# Patient Record
Sex: MALE | Race: WHITE | NOT HISPANIC OR LATINO | Employment: OTHER | URBAN - METROPOLITAN AREA
[De-identification: names, ages, dates, MRNs, and addresses within clinical notes are randomized per-mention and may not be internally consistent; named-entity substitution may affect disease eponyms.]

---

## 2017-05-18 ENCOUNTER — OFFICE VISIT - RIVER FALLS (OUTPATIENT)
Dept: FAMILY MEDICINE | Facility: CLINIC | Age: 67
End: 2017-05-18

## 2017-05-18 ENCOUNTER — COMMUNICATION - RIVER FALLS (OUTPATIENT)
Dept: FAMILY MEDICINE | Facility: CLINIC | Age: 67
End: 2017-05-18

## 2017-05-18 ASSESSMENT — MIFFLIN-ST. JEOR: SCORE: 1886.65

## 2017-05-19 LAB
CHOLEST SERPL-MCNC: 183 MG/DL (ref 125–200)
CHOLEST/HDLC SERPL: 3.7 {RATIO}
CREAT SERPL-MCNC: 0.76 MG/DL (ref 0.7–1.25)
GLUCOSE BLD-MCNC: 103 MG/DL (ref 65–99)
HBA1C MFR BLD: 6.4 %
HDLC SERPL-MCNC: 49 MG/DL
LDLC SERPL CALC-MCNC: 119 MG/DL
NONHDLC SERPL-MCNC: 134 MG/DL
TRIGL SERPL-MCNC: 75 MG/DL

## 2017-10-04 ENCOUNTER — OFFICE VISIT - RIVER FALLS (OUTPATIENT)
Dept: FAMILY MEDICINE | Facility: CLINIC | Age: 67
End: 2017-10-04

## 2017-10-04 ASSESSMENT — MIFFLIN-ST. JEOR: SCORE: 1915.68

## 2017-10-05 LAB
CHOLEST SERPL-MCNC: 174 MG/DL
CHOLEST/HDLC SERPL: 3.9 {RATIO}
CREAT SERPL-MCNC: 0.78 MG/DL (ref 0.7–1.25)
GLUCOSE BLD-MCNC: 102 MG/DL (ref 65–99)
HBA1C MFR BLD: 6 %
HDLC SERPL-MCNC: 45 MG/DL
LDLC SERPL CALC-MCNC: 112 MG/DL
NONHDLC SERPL-MCNC: 129 MG/DL
PSA SERPL-MCNC: 0.8 NG/ML
TRIGL SERPL-MCNC: 81 MG/DL

## 2018-05-07 ENCOUNTER — AMBULATORY - RIVER FALLS (OUTPATIENT)
Dept: FAMILY MEDICINE | Facility: CLINIC | Age: 68
End: 2018-05-07

## 2018-05-08 LAB
CREAT SERPL-MCNC: 1 MG/DL (ref 0.7–1.25)
GLUCOSE BLD-MCNC: 87 MG/DL (ref 65–99)

## 2018-05-14 ENCOUNTER — OFFICE VISIT - RIVER FALLS (OUTPATIENT)
Dept: FAMILY MEDICINE | Facility: CLINIC | Age: 68
End: 2018-05-14

## 2018-05-14 ASSESSMENT — MIFFLIN-ST. JEOR: SCORE: 1915.68

## 2018-10-22 ENCOUNTER — OFFICE VISIT - RIVER FALLS (OUTPATIENT)
Dept: FAMILY MEDICINE | Facility: CLINIC | Age: 68
End: 2018-10-22

## 2018-10-22 ASSESSMENT — MIFFLIN-ST. JEOR: SCORE: 1902.98

## 2019-01-22 ENCOUNTER — OFFICE VISIT - RIVER FALLS (OUTPATIENT)
Dept: FAMILY MEDICINE | Facility: CLINIC | Age: 69
End: 2019-01-22

## 2019-01-22 ASSESSMENT — MIFFLIN-ST. JEOR: SCORE: 1952.87

## 2019-01-23 LAB
A/G RATIO - HISTORICAL: 1.7 (ref 1–2.5)
ALBUMIN SERPL-MCNC: 4.2 GM/DL (ref 3.6–5.1)
ALP SERPL-CCNC: 63 UNIT/L (ref 40–115)
ALT SERPL W P-5'-P-CCNC: 41 UNIT/L (ref 9–46)
AST SERPL W P-5'-P-CCNC: 23 UNIT/L (ref 10–35)
BILIRUB SERPL-MCNC: 0.8 MG/DL (ref 0.2–1.2)
BUN SERPL-MCNC: 17 MG/DL (ref 7–25)
BUN/CREAT RATIO - HISTORICAL: ABNORMAL (ref 6–22)
CALCIUM SERPL-MCNC: 9.2 MG/DL (ref 8.6–10.3)
CHLORIDE BLD-SCNC: 103 MMOL/L (ref 98–110)
CHOLEST SERPL-MCNC: 184 MG/DL
CHOLEST/HDLC SERPL: 3.8 {RATIO}
CO2 SERPL-SCNC: 27 MMOL/L (ref 20–32)
CREAT SERPL-MCNC: 0.75 MG/DL (ref 0.7–1.25)
EGFRCR SERPLBLD CKD-EPI 2021: 96 ML/MIN/1.73M2
ERYTHROCYTE [DISTWIDTH] IN BLOOD BY AUTOMATED COUNT: 13.2 % (ref 11–15)
GLOBULIN: 2.5 (ref 1.9–3.7)
GLUCOSE BLD-MCNC: 108 MG/DL (ref 65–99)
HCT VFR BLD AUTO: 41.5 % (ref 38.5–50)
HDLC SERPL-MCNC: 48 MG/DL
HGB BLD-MCNC: 14.2 GM/DL (ref 13.2–17.1)
LDLC SERPL CALC-MCNC: 119 MG/DL
MCH RBC QN AUTO: 29.2 PG (ref 27–33)
MCHC RBC AUTO-ENTMCNC: 34.2 GM/DL (ref 32–36)
MCV RBC AUTO: 85.4 FL (ref 80–100)
NONHDLC SERPL-MCNC: 136 MG/DL
PLATELET # BLD AUTO: 277 10*3/UL (ref 140–400)
PMV BLD: 10.7 FL (ref 7.5–12.5)
POTASSIUM BLD-SCNC: 4.2 MMOL/L (ref 3.5–5.3)
PROT SERPL-MCNC: 6.7 GM/DL (ref 6.1–8.1)
PSA SERPL-MCNC: 0.9 NG/ML
RBC # BLD AUTO: 4.86 10*6/UL (ref 4.2–5.8)
SODIUM SERPL-SCNC: 137 MMOL/L (ref 135–146)
TRIGL SERPL-MCNC: 75 MG/DL
WBC # BLD AUTO: 5.9 10*3/UL (ref 3.8–10.8)

## 2019-06-25 ENCOUNTER — OFFICE VISIT - RIVER FALLS (OUTPATIENT)
Dept: FAMILY MEDICINE | Facility: CLINIC | Age: 69
End: 2019-06-25

## 2019-06-25 ASSESSMENT — MIFFLIN-ST. JEOR: SCORE: 1952.87

## 2019-07-02 ENCOUNTER — OFFICE VISIT - RIVER FALLS (OUTPATIENT)
Dept: FAMILY MEDICINE | Facility: CLINIC | Age: 69
End: 2019-07-02

## 2019-07-02 ASSESSMENT — MIFFLIN-ST. JEOR: SCORE: 1952.87

## 2019-07-08 ENCOUNTER — COMMUNICATION - RIVER FALLS (OUTPATIENT)
Dept: FAMILY MEDICINE | Facility: CLINIC | Age: 69
End: 2019-07-08

## 2019-07-15 ENCOUNTER — OFFICE VISIT - RIVER FALLS (OUTPATIENT)
Dept: FAMILY MEDICINE | Facility: CLINIC | Age: 69
End: 2019-07-15

## 2019-07-15 ASSESSMENT — MIFFLIN-ST. JEOR: SCORE: 1952.87

## 2019-08-08 ENCOUNTER — OFFICE VISIT - RIVER FALLS (OUTPATIENT)
Dept: FAMILY MEDICINE | Facility: CLINIC | Age: 69
End: 2019-08-08

## 2019-08-08 ASSESSMENT — MIFFLIN-ST. JEOR: SCORE: 1916.59

## 2019-08-09 LAB
A/G RATIO - HISTORICAL: 1.3 (ref 1–2.5)
ALBUMIN SERPL-MCNC: 4 GM/DL (ref 3.6–5.1)
ALP SERPL-CCNC: 73 UNIT/L (ref 40–115)
ALT SERPL W P-5'-P-CCNC: 15 UNIT/L (ref 9–46)
AST SERPL W P-5'-P-CCNC: 11 UNIT/L (ref 10–35)
BILIRUB SERPL-MCNC: 0.4 MG/DL (ref 0.2–1.2)
BUN SERPL-MCNC: 33 MG/DL (ref 7–25)
BUN/CREAT RATIO - HISTORICAL: 28 (ref 6–22)
CALCIUM SERPL-MCNC: 9.5 MG/DL (ref 8.6–10.3)
CHLORIDE BLD-SCNC: 105 MMOL/L (ref 98–110)
CO2 SERPL-SCNC: 28 MMOL/L (ref 20–32)
CREAT SERPL-MCNC: 1.16 MG/DL (ref 0.7–1.25)
EGFRCR SERPLBLD CKD-EPI 2021: 65 ML/MIN/1.73M2
ERYTHROCYTE [DISTWIDTH] IN BLOOD BY AUTOMATED COUNT: 13.3 % (ref 11–15)
GLOBULIN: 3 (ref 1.9–3.7)
GLUCOSE BLD-MCNC: 125 MG/DL (ref 65–99)
HCT VFR BLD AUTO: 35.6 % (ref 38.5–50)
HGB BLD-MCNC: 12.1 GM/DL (ref 13.2–17.1)
MCH RBC QN AUTO: 29.4 PG (ref 27–33)
MCHC RBC AUTO-ENTMCNC: 34 GM/DL (ref 32–36)
MCV RBC AUTO: 86.6 FL (ref 80–100)
PLATELET # BLD AUTO: 324 10*3/UL (ref 140–400)
PMV BLD: 10 FL (ref 7.5–12.5)
POTASSIUM BLD-SCNC: 4 MMOL/L (ref 3.5–5.3)
PROT SERPL-MCNC: 7 GM/DL (ref 6.1–8.1)
RBC # BLD AUTO: 4.11 10*6/UL (ref 4.2–5.8)
SODIUM SERPL-SCNC: 139 MMOL/L (ref 135–146)
WBC # BLD AUTO: 7.8 10*3/UL (ref 3.8–10.8)

## 2019-08-12 ENCOUNTER — COMMUNICATION - RIVER FALLS (OUTPATIENT)
Dept: FAMILY MEDICINE | Facility: CLINIC | Age: 69
End: 2019-08-12

## 2019-10-21 ENCOUNTER — OFFICE VISIT - RIVER FALLS (OUTPATIENT)
Dept: FAMILY MEDICINE | Facility: CLINIC | Age: 69
End: 2019-10-21

## 2019-10-21 ASSESSMENT — MIFFLIN-ST. JEOR: SCORE: 1916.59

## 2020-04-21 ENCOUNTER — OFFICE VISIT - RIVER FALLS (OUTPATIENT)
Dept: FAMILY MEDICINE | Facility: CLINIC | Age: 70
End: 2020-04-21

## 2021-04-05 ENCOUNTER — TRANSFERRED RECORDS (OUTPATIENT)
Dept: MULTI SPECIALTY CLINIC | Facility: CLINIC | Age: 71
End: 2021-04-05

## 2021-04-05 ENCOUNTER — OFFICE VISIT - RIVER FALLS (OUTPATIENT)
Dept: FAMILY MEDICINE | Facility: CLINIC | Age: 71
End: 2021-04-05

## 2021-04-05 ASSESSMENT — MIFFLIN-ST. JEOR: SCORE: 1893.91

## 2021-04-06 LAB
BUN SERPL-MCNC: 23 MG/DL (ref 7–25)
BUN/CREAT RATIO - HISTORICAL: ABNORMAL (ref 6–22)
CALCIUM SERPL-MCNC: 9.9 MG/DL (ref 8.6–10.3)
CHLORIDE BLD-SCNC: 100 MMOL/L (ref 98–110)
CHOLEST SERPL-MCNC: 198 MG/DL
CHOLEST/HDLC SERPL: 4.7 {RATIO}
CO2 SERPL-SCNC: 28 MMOL/L (ref 20–32)
CREAT SERPL-MCNC: 0.87 MG/DL (ref 0.7–1.25)
EGFRCR SERPLBLD CKD-EPI 2021: 88 ML/MIN/1.73M2
GLUCOSE BLD-MCNC: 110 MG/DL (ref 65–99)
HDLC SERPL-MCNC: 42 MG/DL
LDLC SERPL CALC-MCNC: 131 MG/DL
NONHDLC SERPL-MCNC: 156 MG/DL
POTASSIUM BLD-SCNC: 4.3 MMOL/L (ref 3.5–5.3)
SODIUM SERPL-SCNC: 133 MMOL/L (ref 135–146)
TRIGL SERPL-MCNC: 133 MG/DL

## 2021-04-07 ENCOUNTER — COMMUNICATION - RIVER FALLS (OUTPATIENT)
Dept: FAMILY MEDICINE | Facility: CLINIC | Age: 71
End: 2021-04-07

## 2022-02-11 VITALS
HEART RATE: 76 BPM | TEMPERATURE: 98.4 F | BODY MASS INDEX: 34.83 KG/M2 | OXYGEN SATURATION: 98 % | HEIGHT: 71 IN | DIASTOLIC BLOOD PRESSURE: 88 MMHG | WEIGHT: 248.8 LBS | SYSTOLIC BLOOD PRESSURE: 148 MMHG

## 2022-02-11 VITALS
BODY MASS INDEX: 35.98 KG/M2 | OXYGEN SATURATION: 99 % | HEIGHT: 71 IN | DIASTOLIC BLOOD PRESSURE: 72 MMHG | WEIGHT: 257 LBS | HEART RATE: 58 BPM | SYSTOLIC BLOOD PRESSURE: 144 MMHG

## 2022-02-11 VITALS
HEIGHT: 71 IN | DIASTOLIC BLOOD PRESSURE: 82 MMHG | DIASTOLIC BLOOD PRESSURE: 62 MMHG | HEART RATE: 65 BPM | HEART RATE: 66 BPM | DIASTOLIC BLOOD PRESSURE: 62 MMHG | OXYGEN SATURATION: 98 % | SYSTOLIC BLOOD PRESSURE: 138 MMHG | BODY MASS INDEX: 35.98 KG/M2 | OXYGEN SATURATION: 98 % | DIASTOLIC BLOOD PRESSURE: 74 MMHG | BODY MASS INDEX: 34.86 KG/M2 | SYSTOLIC BLOOD PRESSURE: 118 MMHG | HEIGHT: 71 IN | WEIGHT: 257 LBS | SYSTOLIC BLOOD PRESSURE: 138 MMHG | HEIGHT: 71 IN | OXYGEN SATURATION: 99 % | WEIGHT: 249 LBS | HEART RATE: 82 BPM | HEIGHT: 71 IN | OXYGEN SATURATION: 93 % | SYSTOLIC BLOOD PRESSURE: 132 MMHG | BODY MASS INDEX: 35.98 KG/M2 | WEIGHT: 257 LBS | HEART RATE: 61 BPM | BODY MASS INDEX: 35.98 KG/M2 | WEIGHT: 257 LBS

## 2022-02-11 VITALS
SYSTOLIC BLOOD PRESSURE: 136 MMHG | HEIGHT: 71 IN | OXYGEN SATURATION: 99 % | DIASTOLIC BLOOD PRESSURE: 84 MMHG | BODY MASS INDEX: 34.83 KG/M2 | HEART RATE: 61 BPM | WEIGHT: 248.8 LBS

## 2022-02-11 VITALS
OXYGEN SATURATION: 96 % | DIASTOLIC BLOOD PRESSURE: 70 MMHG | SYSTOLIC BLOOD PRESSURE: 134 MMHG | TEMPERATURE: 97.8 F | WEIGHT: 246 LBS | HEART RATE: 81 BPM | HEIGHT: 71 IN | BODY MASS INDEX: 34.44 KG/M2

## 2022-02-11 VITALS
HEART RATE: 68 BPM | HEIGHT: 71 IN | WEIGHT: 242.4 LBS | BODY MASS INDEX: 33.94 KG/M2 | SYSTOLIC BLOOD PRESSURE: 144 MMHG | DIASTOLIC BLOOD PRESSURE: 76 MMHG

## 2022-02-11 VITALS
SYSTOLIC BLOOD PRESSURE: 142 MMHG | BODY MASS INDEX: 34.16 KG/M2 | WEIGHT: 244 LBS | HEART RATE: 76 BPM | DIASTOLIC BLOOD PRESSURE: 78 MMHG | HEIGHT: 71 IN

## 2022-02-11 VITALS
SYSTOLIC BLOOD PRESSURE: 130 MMHG | BODY MASS INDEX: 34.86 KG/M2 | DIASTOLIC BLOOD PRESSURE: 74 MMHG | OXYGEN SATURATION: 97 % | HEIGHT: 71 IN | TEMPERATURE: 97.1 F | WEIGHT: 249 LBS | HEART RATE: 74 BPM

## 2022-02-16 NOTE — PROGRESS NOTES
Patient:   ELSY NOYOLA            MRN: [id number]            FIN: [id number]               Age:   70 years     Sex:  Male     :  1950   Associated Diagnoses:   HTN (hypertension)   Author:   Pan Armenta MD      Impression and Plan   Diagnosis     HTN (hypertension) (BSX45-[id number] I10).     Course:  Improving.    Orders     Orders   Charges (Evaluation and Management):  88887 physician telephone evaluation 11-20 min (Charge) (Order): Quantity: 1, HTN (hypertension).     Orders (Selected)   Prescriptions  Prescribed  hydrochlorothiazide-lisinopril 12.5 mg-20 mg oral tablet: 1 tab(s), Oral, daily, # 90 EA, 1 Refill(s), Type: Maintenance, Pharmacy: [id number], Pt is due for med check, 1 tab(s) Oral daily.        Visit Information      Date of Service: 2020 07:51 am  Performing Location: [facility]  Encounter#: [id number]      Primary Care Provider (PCP):  Pan Armenta MD    NPI# [id number]   Visit type:  Telephone Encounter.    Source of history:  Self.    Location of patient:  _home  Call Start Time:   _820  Call End Time:    _840   Referral source:  Self.    History limitation:  None.       Chief Complaint   Patient needs routine refill of regular medications   2020 8:08 AM CDT    verbal consent given for telephone visit to discuss meds and get refills     _      History of Present Illness   Today's visit was conducted via telephone due to the COVID-19 pandemic.     Reason for visit:  _Patient needs refill of BP med.  No medical complaints otherwise.  Checks BP with home monitor and runs around 120/70.      Review of Systems   Constitutional:  Negative.    Eye:  Negative.    Ear/Nose/Mouth/Throat:  Negative.    Respiratory:  Negative.    Cardiovascular:  Negative.    Gastrointestinal:  Negative.    Genitourinary:  Negative.    Hematology/Lymphatics:  Negative.    Endocrine:  Negative.    Immunologic:  Negative.    Musculoskeletal:  Negative.    Integumentary:   Negative.    Neurologic:  Negative.    Psychiatric:  Negative.    All other systems reviewed and negative      Health Status   Allergies:    Allergic Reactions (Selected)  No known allergies   Medications:  (Selected)   Prescriptions  Prescribed  hydrochlorothiazide-lisinopril 12.5 mg-20 mg oral tablet: 1 tab(s), Oral, daily, # 90 EA, 1 Refill(s), Type: Maintenance, Pharmacy: [id number], Pt is due for med check, 1 tab(s) Oral daily  Documented Medications  Documented  aspirin 81 mg oral delayed release tablet: = 1 tab(s) ( 81 mg ), Oral, daily, 0 Refill(s), Type: Maintenance,    Medications          *denotes recorded medication          *aspirin 81 mg oral delayed release tablet: 81 mg, 1 tab(s), Oral, daily, 0 Refill(s).          hydrochlorothiazide-lisinopril 12.5 mg-20 mg oral tablet: 1 tab(s), Oral, daily, 90 EA, 1 Refill(s).       Problem list:    All Problems  Allergic rhinitis / SNOMED CT 194018137 / Confirmed  ED (erectile dysfunction) / SNOMED CT 477116497 / Confirmed  GERD (gastroesophageal reflux disease) / SNOMED CT 763966896 / Confirmed  HTN (hypertension) / SNOMED CT 9220210719 / Confirmed  Impingement syndrome of shoulder / SNOMED CT 050303635 / Confirmed  Obesity / SNOMED CT 3657467482 / Probable  Onychomycosis / SNOMED CT 1928499762 / Confirmed  Osteoarthritis, knee / SNOMED CT 824026223 / Confirmed  Photosensitivity / SNOMED CT 421767083 / Confirmed  Reactive airway disease / SNOMED CT 0023251130 / Confirmed      Histories   Past Medical History:    Active  Allergic rhinitis (741243383)  Impingement syndrome of shoulder (174525569)  HTN (hypertension) (1082574373)  ED (erectile dysfunction) (933200759)  Photosensitivity (550295808)  Obesity (2772942276)  Onychomycosis (3051231452)  Osteoarthritis, knee (451264756)   Family History:    Lymphosarcoma  Mother ()  Stroke  Father ()  Heart attack  Mother ()     Procedure history:    Colonoscopy (SNOMED CT 164761476)  performed by Rene Talley MD on 5/9/2013 at 63 Years.  Comments:  5/14/2013 3:21 PM CDT - Uziel Hines MA  Normal colonoscopy, repeat in 10 years  Toe reattachment in 1964 at 14 Years.  Comments:  10/1/2015 10:03 AM CDT - Nelia Grady  2nd & 3rd, right foot   Social History:        Alcohol Assessment: Current            Current, Beer, Daily      Tobacco Assessment: Denies Tobacco Use      Exercise and Physical Activity Assessment: Regular exercise            Exercise type: Walking.        Physical Examination   General:  Alert and oriented, No acute distress.    Respiratory:  Respirations are non-labored.    Psychiatric:  Cooperative, Appropriate mood & affect, Normal judgment.

## 2022-02-16 NOTE — TELEPHONE ENCOUNTER
---------------------  From: Kaylah Murray CMA   To: Pan Armenta MD;     Sent: 4/29/2019 10:19:27 AM CDT  Subject: General Message     Phone Message    PCP:   SULTANA      Time of Call:  _       Person Calling:  luisa Montiel  Phone number:  [phone]    Returned call at: _    Note:   Pt is needing a refill of his losarten but it is recalled so they are wondering what they should do?    Last office visit and reason:  _    Transferred to: _---------------------  From: Pan Armenta MD   To: Kaylah Murray CMA;     Sent: 4/29/2019 12:03:45 PM CDT  Subject: RE: General Message     eRx sent for LISINOPRIL/HCTZpt notified

## 2022-02-16 NOTE — NURSING NOTE
Comprehensive Intake Entered On:  1/22/2019 9:03 AM CST    Performed On:  1/22/2019 8:59 AM CST by Kaylah Murray CMA               Summary   Chief Complaint :   Pt here for med ck   Weight Measured :   257 lb(Converted to: 257 lb 0 oz, 116.57 kg)    Height Measured :   71 in(Converted to: 5 ft 11 in, 180.34 cm)    Body Mass Index :   35.84 kg/m2 (HI)    Body Surface Area :   2.41 m2   Systolic Blood Pressure :   144 mmHg (HI)    Diastolic Blood Pressure :   72 mmHg   Mean Arterial Pressure :   96 mmHg   Peripheral Pulse Rate :   58 bpm (LOW)    BP Site :   Left arm   Oxygen Saturation :   99 %   Kaylah Murray CMA - 1/22/2019 8:59 AM CST   Health Status   Allergies Verified? :   Yes   Medication History Verified? :   Yes   Medical History Verified? :   Yes   Pre-Visit Planning Status :   Completed   Tobacco Use? :   Former smoker   Kaylah Murray CMA - 1/22/2019 8:59 AM CST   Consents   Consent for Immunization Exchange :   Consent Granted   Consent for Immunizations to Providers :   Consent Granted   aKylah Murray CMA - 1/22/2019 8:59 AM CST   Meds / Allergies   (As Of: 1/22/2019 9:03:16 AM CST)   Allergies (Active)   No known allergies  Estimated Onset Date:   Unspecified ; Created By:   Emani Becerra CMA; Reaction Status:   Active ; Category:   Drug ; Substance:   No known allergies ; Type:   Allergy ; Updated By:   Emani Becerra CMA; Reviewed Date:   1/22/2019 8:59 AM CST        Medication List   (As Of: 1/22/2019 9:03:16 AM CST)   Prescription/Discharge Order    losartan  :   losartan ; Status:   Prescribed ; Ordered As Mnemonic:   losartan 100 mg oral tablet ; Simple Display Line:   100 mg, 1 tab(s), po, daily, 90 tab(s), 1 Refill(s) ; Ordering Provider:   Pan Armenta MD; Catalog Code:   losartan ; Order Dt/Tm:   5/14/2018 4:23:05 PM          sildenafil  :   sildenafil ; Status:   Prescribed ; Ordered As Mnemonic:   Viagra 100 mg oral tablet ; Simple Display Line:   100 mg, 1 tab(s), PO, Daily, 20  tab(s), 0 Refill(s) ; Ordering Provider:   Pan Armenta MD; Catalog Code:   sildenafil ; Order Dt/Tm:   3/22/2018 1:32:30 PM            Home Meds    aspirin  :   aspirin ; Status:   Documented ; Ordered As Mnemonic:   aspirin 81 mg oral enteric coated tablet ; Simple Display Line:   81 mg, 1 tab(s), PO, Daily ; Catalog Code:   aspirin ; Order Dt/Tm:   1/25/2010 11:51:57 AM

## 2022-02-16 NOTE — PROGRESS NOTES
Patient:   ELSY NOYOLA            MRN: [id number]            FIN: [id number]               Age:   68 years     Sex:  Male     :  1950   Associated Diagnoses:   HTN (hypertension)   Author:   Pan Armenta MD      Impression and Plan   Diagnosis     HTN (hypertension) ([id number] I10).     Course:  Worsening.    Orders     Orders   Charges (Evaluation and Management):  20904 office outpatient visit 15 minutes (Charge) (Order): Quantity: 1, HTN (hypertension).     Orders (Selected)   Outpatient Orders  Ordered  US Gallbladder (Request): RUQ abdominal pain  Ordered (Dispatched)  CBC (h/h, RBC, indices, WBC, Plt)* ([facility]): Specimen Type: Blood, Collection Date: 19 9:36:00 CST  Comprehensive Metabolic Panel* ([facility]): Specimen Type: Serum, Collection Date: 19 9:36:00 CST  Lipid panel with reflex to direct ldl* ([facility]): Specimen Type: Serum, Collection Date: 19 9:36:00 CST  PSA, Total (Room)* ([facility]): Specimen Type: Serum, Collection Date: 19 9:36:00 CST  Prescriptions  Prescribed  hydrochlorothiazide-losartan 25 mg-100 mg oral tablet: 1 tab(s), PO, Daily, # 90 tab(s), 1 Refill(s), Type: Maintenance, Pharmacy: [id number], 1 tab(s) Oral daily.        Visit Information      Date of Service: 2019 08:56 am  Performing Location: [facility]  Encounter#: [id number]      Primary Care Provider (PCP):  Pan Armenta MD    NPI# [id number]      Referring Provider:  Pan Armenta MD, NPI# [id number]   Visit type:  Scheduled follow-up.    Accompanied by:  No one.    Source of history:  Self.    Referral source:  Self.    History limitation:  None.       Chief Complaint   Chief complaint discussed and confirmed correct.     2019 8:59 AM CST    Pt here for med ck        History of Present Illness             The patient presents for follow-up evaluation of hypertension.  The quality of hypertension symptom(s) since the patient's last visit is described as being  unchanged.  The severity of the hypertension symptom(s) since the last visit is mild.  Since the patient's last visit, the timing/course of hypertension symptom(s) is constant.  There are no modifying factors.  Associated symptoms consist of none.  Medical encounters: none.  Compliance problems: none.        Review of Systems   Constitutional:  Negative.    Eye:  Negative.    Ear/Nose/Mouth/Throat:  Negative.    Respiratory:  Negative.    Cardiovascular:  Negative.    Gastrointestinal:  Negative, dyspepsia.    Genitourinary:  Erectile dysfunction.    Hematology/Lymphatics:  Negative.    Endocrine:  Negative.    Immunologic:  Negative.    Musculoskeletal:  shoulder and knee pain.    Integumentary:  Negative.    Neurologic:  Negative.    Psychiatric:  Negative.    All other systems reviewed and negative      Health Status   Allergies:    Allergic Reactions (Selected)  No known allergies   Medications:  (Selected)   Prescriptions  Prescribed  Viagra 100 mg oral tablet: 1 tab(s) ( 100 mg ), PO, Daily, # 20 tab(s), 0 Refill(s), Type: Maintenance, Pharmacy: [facility], 1 tab(s) po daily  losartan 100 mg oral tablet: 1 tab(s) ( 100 mg ), po, daily, # 90 tab(s), 1 Refill(s), Type: Maintenance, Pharmacy: [id number], 1 tab(s) po daily  Documented Medications  Documented  aspirin 81 mg oral enteric coated tablet: 1 tab(s) ( 81 mg ), PO, Daily, 0   Problem list:    All Problems  Allergic rhinitis / SNOMED CT 132024698 / Confirmed  ED (erectile dysfunction) / SNOMED CT 418059684 / Confirmed  GERD (gastroesophageal reflux disease) / SNOMED CT 342497265 / Confirmed  HTN (hypertension) / SNOMED CT 4117932340 / Confirmed  Impingement syndrome of shoulder / SNOMED CT 584209190 / Confirmed  Obesity / SNOMED CT 6738879708 / Probable  Onychomycosis / SNOMED CT 8338104337 / Confirmed  Osteoarthritis, knee / SNOMED CT 004706577 / Confirmed  Photosensitivity / SNOMED CT 584471846 / Confirmed  Reactive airway disease /  SNOMED CT 7838566407 / Confirmed      Histories   Past Medical History:    Active  Allergic rhinitis (456279628)  Impingement syndrome of shoulder (047549224)  HTN (hypertension) (0388818703)  ED (erectile dysfunction) (283569160)  Photosensitivity (960034057)  Obesity (0472673645)  Onychomycosis (7811157369)  Osteoarthritis, knee (301135840)   Family History:    Lymphosarcoma  Mother ()  Stroke  Father ()  Heart attack  Mother ()     Procedure history:    Colonoscopy (SNOMED CT 312565302) performed by Rene Talley MD on 2013 at 63 Years.  Comments:  2013 3:21 PM CDT - Uziel Hines MA  Normal colonoscopy, repeat in 10 years  Toe reattachment in 1964 at 14 Years.  Comments:  10/1/2015 10:03 AM CDT - Nelia Grady  2nd & 3rd, right foot   Social History:        Alcohol Assessment: Current            Current, Beer, Daily      Tobacco Assessment: Denies Tobacco Use      Exercise and Physical Activity Assessment: Regular exercise            Exercise type: Walking.      Physical Examination   Vital signs reviewed  and within acceptable limits    Vital Signs   2019 8:59 AM CST Peripheral Pulse Rate 58 bpm  LOW    Systolic Blood Pressure 144 mmHg  HI    Diastolic Blood Pressure 72 mmHg    Mean Arterial Pressure 96 mmHg    BP Site Left arm    Oxygen Saturation 99 %      Measurements from flowsheet : Measurements   2019 8:59 AM CST Height Measured - Standard 71 in    Weight Measured - Standard 257 lb    BSA 2.41 m2    Body Mass Index 35.84 kg/m2  HI      General:  No acute distress.    Neck:  Supple, No lymphadenopathy, No thyromegaly.    Respiratory:  Lungs are clear to auscultation, Respirations are non-labored, Breath sounds are equal, Symmetrical chest wall expansion.    Cardiovascular:  Normal rate, Regular rhythm, No murmur, No gallop, Good pulses equal in all extremities, Normal peripheral perfusion, No edema.    Gastrointestinal:  Soft, Non-tender, Non-distended,  Normal bowel sounds, No organomegaly.    Integumentary:  Warm, Dry, Pink, 1 cm diameter skin lesion right mid thoracic back..    Neurologic:  Alert, Oriented.    Psychiatric:  Cooperative, Appropriate mood & affect.       Review / Management   Results review

## 2022-02-16 NOTE — NURSING NOTE
Hearing and Vision Screening Entered On:  4/5/2021 11:38 AM CDT    Performed On:  4/5/2021 11:38 AM CDT by Kathy Gomez LPN               Hearing and Vision Screening   Audiogram Result Right Ear :   Fail   Audiogram Result Left Ear :   Fail   Hearing Screen Comments :   uses hearing aides   Kathy Gomez LPN - 4/5/2021 11:38 AM CDT

## 2022-02-16 NOTE — NURSING NOTE
Medicare Visit Entered On:  2021 11:09 AM CDT    Performed On:  2021 10:56 AM CDT by Kathy Goemz LPN               Summary   Chief Complaint :   AWV-medicare, right groin pain.    Weight Measured :   244 lb(Converted to: 244 lb 0 oz, 110.677 kg)    Height Measured :   71 in(Converted to: 5 ft 11 in, 180.34 cm)    Body Mass Index :   34.03 kg/m2 (HI)    Body Surface Area :   2.35 m2   Systolic Blood Pressure :   142 mmHg (HI)    Diastolic Blood Pressure :   78 mmHg   Mean Arterial Pressure :   99 mmHg   Peripheral Pulse Rate :   76 bpm   BP Site :   Right arm   Pulse Site :   Radial artery   BP Method :   Manual   HR Method :   Manual   Kathy Gomez LPN - 2021 10:56 AM CDT   Health Status   Allergies Verified? :   Yes   Medication History Verified? :   Yes   Pre-Visit Planning Status :   Completed   Tobacco Use? :   Former smoker   Kathy Gomez LPN - 2021 10:56 AM CDT   Demographics   Last Name :   JAZMÍN   Address :   [address] [place] [address] [address]   First Name :   ELSY Payan Initial :   A   Responsible Party Date of Birth () :   1950 CST   City :   [place]   State :   [place]   Zip Code :   [address]   Kathy Gomez LPN - 2021 10:56 AM CDT   Consents   Consent for Immunization Exchange :   Consent Granted   Consent for Immunizations to Providers :   Consent Granted   Kathy Gomez LPN - 2021 10:56 AM CDT   Problems   (As Of: 2021 11:09:52 AM CDT)   Problems(Active)    Allergic rhinitis (SNOMED CT  :095361367 )  Name of Problem:   Allergic rhinitis ; Recorder:   Emani Becerra CMA; Confirmation:   Confirmed ; Classification:   Medical ; Code:   974614791 ; Contributor System:   [employer] ; Last Updated:   10/1/2015 9:58 AM CDT ; Life Cycle Date:   2010 ; Life Cycle Status:   Active ; Vocabulary:   SNOMED CT        ED (erectile dysfunction) (SNOMED CT  :739497128 )  Name of Problem:   ED (erectile dysfunction) ; Recorder:   Emani Becerra CMA; Confirmation:    Confirmed ; Classification:   Medical ; Code:   372886691 ; Contributor System:   PowerChart ; Last Updated:   10/1/2015 9:58 AM CDT ; Life Cycle Date:   1/25/2010 ; Life Cycle Status:   Active ; Vocabulary:   SNOMED CT        GERD (gastroesophageal reflux disease) (SNOMED CT  :952822480 )  Name of Problem:   GERD (gastroesophageal reflux disease) ; Recorder:   Pan Armenta MD; Confirmation:   Confirmed ; Classification:   Medical ; Code:   522664156 ; Contributor System:   PowerChart ; Last Updated:   10/4/2017 9:20 AM CDT ; Life Cycle Status:   Active ; Responsible Provider:   Pan Armenta MD; Vocabulary:   SNOMED CT        HTN (hypertension) (SNOMED CT  :5362545897 )  Name of Problem:   HTN (hypertension) ; Recorder:   Emani Becerra CMA; Confirmation:   Confirmed ; Classification:   Medical ; Code:   5829162576 ; Contributor System:   PowerChart ; Last Updated:   10/1/2015 9:59 AM CDT ; Life Cycle Date:   1/25/2010 ; Life Cycle Status:   Active ; Vocabulary:   SNOMED CT        Impingement syndrome of shoulder (SNOMED CT  :498441587 )  Name of Problem:   Impingement syndrome of shoulder ; Recorder:   Emani Becerra CMA; Confirmation:   Confirmed ; Classification:   Medical ; Code:   505285126 ; Contributor System:   PowerChart ; Last Updated:   10/1/2015 9:57 AM CDT ; Life Cycle Date:   1/25/2010 ; Life Cycle Status:   Active ; Vocabulary:   SNOMED CT        Obesity (SNOMED CT  :5068375235 )  Name of Problem:   Obesity ; Recorder:   SYSTEM; Confirmation:   Probable ; Classification:   Medical ; Code:   2580133894 ; Contributor System:   PowerChart ; Last Updated:   10/1/2015 9:59 AM CDT ; Life Cycle Date:   7/5/2011 ; Life Cycle Status:   Active ; Vocabulary:   SNOMED CT        Onychomycosis (SNOMED CT  :8236885763 )  Name of Problem:   Onychomycosis ; Recorder:   Pan Armenta MD; Confirmation:   Confirmed ; Classification:   Medical ; Code:   3337375564 ; Contributor System:   PowerChart ; Last Updated:    10/1/2015 9:59 AM CDT ; Life Cycle Date:   7/5/2011 ; Life Cycle Status:   Active ; Responsible Provider:   Pan Armenta MD; Vocabulary:   SNOMED CT        Osteoarthritis, knee (SNOMED CT  :197247724 )  Name of Problem:   Osteoarthritis, knee ; Recorder:   Pan Armenta MD; Confirmation:   Confirmed ; Classification:   Medical ; Code:   990660334 ; Contributor System:   [employer] ; Last Updated:   10/1/2015 9:58 AM CDT ; Life Cycle Date:   6/25/2013 ; Life Cycle Status:   Active ; Responsible Provider:   Pan Armenta MD; Vocabulary:   SNOMED CT        Photosensitivity (SNOMED CT  :535203761 )  Name of Problem:   Photosensitivity ; Recorder:   Emani Becerra CMA; Confirmation:   Confirmed ; Classification:   Medical ; Code:   040474790 ; Contributor System:   [employer] ; Last Updated:   10/1/2015 9:58 AM CDT ; Life Cycle Date:   1/25/2010 ; Life Cycle Status:   Active ; Vocabulary:   SNOMED CT        Reactive airway disease (SNOMED CT  :2713337154 )  Name of Problem:   Reactive airway disease ; Recorder:   Pan Armenta MD; Confirmation:   Confirmed ; Classification:   Medical ; Code:   0105352875 ; Contributor System:   [employer] ; Last Updated:   7/13/2015 8:54 AM CDT ; Life Cycle Date:   7/13/2015 ; Life Cycle Status:   Active ; Responsible Provider:   Pan Armenta MD; Vocabulary:   SNOMED CT          Meds / Allergies   (As Of: 4/5/2021 11:09:52 AM CDT)   Allergies (Active)   No known allergies  Estimated Onset Date:   Unspecified ; Created By:   Emani Becerra CMA; Reaction Status:   Active ; Category:   Drug ; Substance:   No known allergies ; Type:   Allergy ; Updated By:   Emani Becerra CMA; Reviewed Date:   4/5/2021 11:09 AM CDT        Medication List   (As Of: 4/5/2021 11:09:52 AM CDT)   Prescription/Discharge Order    hydrochlorothiazide-lisinopril  :   hydrochlorothiazide-lisinopril ; Status:   Prescribed ; Ordered As Mnemonic:   hydrochlorothiazide-lisinopril 12.5 mg-20 mg oral tablet ; Simple  Display Line:   1 tab(s), Oral, daily, 90 tab(s), 1 Refill(s) ; Ordering Provider:   Pan Armenta MD; Catalog Code:   hydrochlorothiazide-lisinopril ; Order Dt/Tm:   10/26/2020 3:37:59 PM CDT            Home Meds    aspirin  :   aspirin ; Status:   Documented ; Ordered As Mnemonic:   aspirin 81 mg oral delayed release tablet ; Simple Display Line:   81 mg, 1 tab(s), Oral, daily, 0 Refill(s) ; Catalog Code:   aspirin ; Order Dt/Tm:   8/8/2019 3:34:04 PM CDT          omeprazole  :   omeprazole ; Status:   Documented ; Ordered As Mnemonic:   omeprazole 20 mg oral delayed release capsule ; Simple Display Line:   20 mg, 1 cap(s), Oral, daily, 90 cap(s), 0 Refill(s) ; Catalog Code:   omeprazole ; Order Dt/Tm:   4/5/2021 11:09:18 AM CDT            Social History   Social History   (As Of: 4/5/2021 11:09:52 AM CDT)   Alcohol:  Current      Current, Beer, Daily   (Last Updated: 8/17/2010 10:42:15 AM CDT by Liz Mandel)          Tobacco:  Denies Tobacco Use      Former smoker, quit more than 30 days ago   (Last Updated: 4/5/2021 10:56:36 AM CDT by Kathy Gomez LPN)          Electronic Cigarette/Vaping:        Electronic Cigarette Use: Never.   (Last Updated: 4/5/2021 10:56:43 AM CDT by Kathy Gomez LPN)          Exercise:  Regular exercise      Exercise type: Walking.   (Last Updated: 8/17/2010 10:42:26 AM CDT by Liz Mandel)            Geriatric Depression Screening   Geriatric Depression Satisfied Life :   Yes   Geriatric Depression Dropped Activities :   No   Geriatric Depression Life Empty :   No   Geriatric Depression Bored :   No   Geriatric Depression Good Spirits :   Yes   Geriatric Depression Afraid Bad Things :   No   Geriatric Depression Feel Happy :   Yes   Geriatric Depression Feel Helpless :   No   Geriatric Depression Prefer to Stay Home :   No   Geriatric Depression Memory Problems :   No   Geriatric Depression Wonderful Be Alive :   Yes   Geriatric Depression Feel Worthless :   No   Geriatric  Depression Situation Hopeless :   No   Geriatric Depression Others Better Off :   No   Geriatric Depression Full of Energy :   Yes   Geriatric Depression Total Score :   0    Kathy Gomez LPN - 4/5/2021 10:56 AM CDT   Home Safety Screen   Emergency Numbers Kept/Updated :   Yes   Aware of Smoking Dangers :   Yes   Smoke Alarms/Fire Extinguisher Available :   Yes   Household Members Fire Safety Knowledge :   Yes   Firearms Unloaded and Secure :   Yes   Floor Rugs Removed or Fastened :   Yes   Mats in Bathtub/Shower :   Yes   Stairway Rails or Banisters :   Yes   Outdoor Clutter Safety :   Yes   Indoor Clutter Safety :   Yes   Electrical Cord Safety :   Yes   Kathy Gomez LPN - 4/5/2021 10:56 AM CDT   Advance Directives   Advance Directive :   No   Kathy Gomez LPN - 4/5/2021 10:56 AM CDT   Lin Fall Risk   History of Fall in Last 3 Months Lin :   No   Kathy Gomez LPN - 4/5/2021 10:56 AM CDT   Functional Assessment   Focused Functional Assessment Grid   Bathing :   Independent (2)   Dressing :   Independent (2)   Toileting :   Independent (2)   Transferring Bed or Chair :   Independent (2)   Continence :   Independent (2)   Feeding :   Independent (2)   Kathy Gomez LPN - 4/5/2021 10:56 AM CDT   ADL Index Score :   12    Capable of Shopping :   Yes   Capable of Walking :   Yes   Capable of Housekeeping :   Yes   Capable of Managing Medications :   Yes   Capable of Handling Finances :   Yes   Kathy Gomez LPN - 4/5/2021 10:56 AM CDT

## 2022-02-16 NOTE — PROGRESS NOTES
Patient:   ELSY NOYOLA            MRN: [id number]            FIN: [id number]               Age:   68 years     Sex:  Male     :  1950   Associated Diagnoses:   Sinusitis; HTN (hypertension)   Author:   Pan Armenta MD      Impression and Plan   Diagnosis     Sinusitis (WXN39-UL J01.41).     Course:  Worsening.    Orders     Orders   Charges (Evaluation and Management):  60775 office outpatient visit 25 minutes (Charge) (Order): Quantity: 1, ED (erectile dysfunction)  HTN (hypertension)  Sinusitis.     Orders (Selected)   Prescriptions  Prescribed  Augmentin 875 mg-125 mg oral tablet: 1 tab(s), po, bidac, # 20 tab(s), 0 Refill(s), Type: Maintenance, Pharmacy: [facility], 1 tab(s) po bidac,x10 day(s).     Diagnosis     HTN (hypertension) (AVB31-UB I10).     Course:  Progressing as expected.    Orders     Orders (Selected)   Prescriptions  Prescribed  losartan 100 mg oral tablet: 1 tab(s) ( 100 mg ), po, daily, # 90 tab(s), 0 Refill(s), Type: Maintenance, Pharmacy: [id number], Pt due for appt for refills, 1 tab(s) po daily.        Visit Information      Date of Service: 2018 03:23 pm  Performing Location: [facility]  Encounter#: [id number]      Primary Care Provider (PCP):  Pan Armenta MD    NPI# [id number]   Visit type:  New symptom.    Accompanied by:  No one.    Source of history:  Self.    History limitation:  None.       Chief Complaint   Chief complaint discussed and confirmed correct.     2018 3:31 PM CDT    Pt here for med ck        History of Present Illness             The patient presents with sinus problem.  The sinus problem is located in the frontal sinus, ethmoid sinus and maxillary sinus.  The sinus problem is characterized by nasal congestion, headache and facial pain.  The severity of the sinus problem is severe.  The sinus problem is constant.  The sinus problem has lasted for 1 week(s).  The context of the sinus problem: occurred in  association with illness.  Associated symptoms consist of cough.        Review of Systems   Constitutional:  Negative.    Eye:  Negative.    Ear/Nose/Mouth/Throat:  Nasal congestion, Sinus pain.    Respiratory:  Negative.    Cardiovascular:  Negative.    Gastrointestinal:  Negative.    Genitourinary:  Negative.    Hematology/Lymphatics:  Negative.    Endocrine:  Negative.    Immunologic:  Negative.    Musculoskeletal:  Negative.    Integumentary:  Negative.    Neurologic:  Negative.    Psychiatric:  Negative.    All other systems reviewed and negative      Health Status   Allergies:    Allergic Reactions (Selected)  No known allergies   Medications:  (Selected)   Prescriptions  Prescribed  Augmentin 875 mg-125 mg oral tablet: 1 tab(s), po, bidac, # 20 tab(s), 0 Refill(s), Type: Maintenance, Pharmacy: [facility], 1 tab(s) po bidac,x10 day(s)  Viagra 100 mg oral tablet: 1 tab(s) ( 100 mg ), PO, Daily, # 20 tab(s), 0 Refill(s), Type: Maintenance, Pharmacy: [facility], 1 tab(s) po daily  losartan 100 mg oral tablet: 1 tab(s) ( 100 mg ), po, daily, # 90 tab(s), 0 Refill(s), Type: Maintenance, Pharmacy: [id number], Pt due for appt for refills, 1 tab(s) po daily  Documented Medications  Documented  aspirin 81 mg oral enteric coated tablet: 1 tab(s) ( 81 mg ), PO, Daily, 0   Problem list:    All Problems  Allergic rhinitis / SNOMED CT 555710767 / Confirmed  ED (erectile dysfunction) / SNOMED CT 168353328 / Confirmed  GERD (gastroesophageal reflux disease) / SNOMED CT 183130467 / Confirmed  HTN (hypertension) / SNOMED CT 1926444360 / Confirmed  Impingement syndrome of shoulder / SNOMED CT 735571516 / Confirmed  Obesity / SNOMED CT 9128086601 / Probable  Onychomycosis / SNOMED CT 9434198011 / Confirmed  Osteoarthritis, knee / SNOMED CT 080914765 / Confirmed  Photosensitivity / SNOMED CT 421694479 / Confirmed  Reactive airway disease / SNOMED CT 4130380714 / Confirmed      Histories   Past Medical  History:    Active  Allergic rhinitis (498378448)  Impingement syndrome of shoulder (757655594)  HTN (hypertension) (9796015002)  ED (erectile dysfunction) (351339170)  Photosensitivity (628148066)  Obesity (3135749087)  Onychomycosis (1022061142)  Osteoarthritis, knee (629080734)   Family History:    Lymphosarcoma  Mother ()  Stroke  Father ()  Heart attack  Mother ()     Procedure history:    Colonoscopy (SNOMED CT 861702526) performed by Rene Talley MD on 2013 at 63 Years.  Comments:  2013 3:21 PM - Uziel Hines MA  Normal colonoscopy, repeat in 10 years  Toe reattachment in 1964 at 14 Years.  Comments:  10/1/2015 10:03 AM - Nelia Grady  2nd & 3rd, right foot   Social History:        Alcohol Assessment: Current            Current, Beer, Daily      Tobacco Assessment: Denies Tobacco Use      Exercise and Physical Activity Assessment: Regular exercise            Exercise type: Walking.        Physical Examination   Vital signs reviewed  and within acceptable limits    Vital Signs   2018 3:47 PM CDT Systolic Blood Pressure 138 mmHg  HI   2018 3:31 PM CDT Temperature Tympanic 98.4 DegF    Peripheral Pulse Rate 76 bpm    Systolic Blood Pressure 148 mmHg  HI    Diastolic Blood Pressure 88 mmHg  HI    Mean Arterial Pressure 108 mmHg    BP Site Right arm    Oxygen Saturation 98 %      Measurements from flowsheet : Measurements   2018 3:31 PM CDT Height Measured - Standard 71 in    Weight Measured - Standard 248.8 lb    BSA 2.38 m2    Body Mass Index 34.7 kg/m2  HI      General:  Alert and oriented, No acute distress.    Eye:  Pupils are equal, round and reactive to light, Extraocular movements are intact, Normal conjunctiva.    HENT:  Normocephalic, Tympanic membranes are clear, Normal hearing, Oral mucosa is moist, No pharyngeal erythema.         Sinus: Bilateral, Ethmoid sinus, Frontal sinus, Maxillary sinus, Tenderness.    Neck:  Supple, Non-tender, No  lymphadenopathy.    Respiratory:  Lungs are clear to auscultation, Respirations are non-labored, Breath sounds are equal.    Cardiovascular:  Normal rate, Regular rhythm, No murmur, Normal peripheral perfusion, No edema.    Integumentary:  Warm, Dry, Pink.    Psychiatric:  Cooperative, Appropriate mood & affect, Normal judgment.

## 2022-02-16 NOTE — LETTER
(Inserted Image. Unable to display)   [address] [facility] [address]  July 08, 2019      ELSY NOYOLA   [place], [place] [id number]        Dear ELSY,     Thank you for selecting [facility] for your healthcare needs. Below you will find the results of the recent tests done at our clinic.      The skin lesion turned out to be a benign common Wart.      Result Name Current Result   Tissue Pathology Report   7/2/2019       Please contact my practice at [phone]  if you have any questions or concerns.     Sincerely,        Pan Armenta MD          What do your labs mean?  Below is a glossary of commonly ordered labs:  LDL   Bad Cholesterol   HDL   Good Cholesterol  AST/ALT   Liver Function   Cr/Creatinine   Kidney Function  Microalbumin   Kidney Function  BUN   Kidney Function  PSA   Prostate    TSH   Thyroid Hormone  HgbA1c   Diabetes Test   Hgb (Hemoglobin)   Red Blood Cells

## 2022-02-16 NOTE — NURSING NOTE
Comprehensive Intake Entered On:  7/15/2019 3:38 PM CDT    Performed On:  7/15/2019 3:35 PM CDT by Kaylah Murray CMA               Summary   Chief Complaint :   Pt here for suture removal   Weight Measured :   257 lb(Converted to: 257 lb 0 oz, 116.57 kg)    Height Measured :   71 in(Converted to: 5 ft 11 in, 180.34 cm)    Body Mass Index :   35.84 kg/m2 (HI)    Body Surface Area :   2.41 m2   Systolic Blood Pressure :   118 mmHg   Diastolic Blood Pressure :   62 mmHg   Mean Arterial Pressure :   81 mmHg   Peripheral Pulse Rate :   66 bpm   Oxygen Saturation :   99 %   Kaylah Murray CMA - 7/15/2019 3:35 PM CDT   Health Status   Allergies Verified? :   Yes   Medication History Verified? :   Yes   Medical History Verified? :   Yes   Pre-Visit Planning Status :   Completed   Tobacco Use? :   Former smoker   Kaylah Murray CMA - 7/15/2019 3:35 PM CDT   Consents   Consent for Immunization Exchange :   Consent Granted   Consent for Immunizations to Providers :   Consent Granted   Kaylah Murray CMA - 7/15/2019 3:35 PM CDT   Meds / Allergies   (As Of: 7/15/2019 3:38:02 PM CDT)   Allergies (Active)   No known allergies  Estimated Onset Date:   Unspecified ; Created By:   Emani Becerra; Reaction Status:   Active ; Category:   Drug ; Substance:   No known allergies ; Type:   Allergy ; Updated By:   Emani Becerra; Reviewed Date:   7/15/2019 3:36 PM CDT        Medication List   (As Of: 7/15/2019 3:38:02 PM CDT)   Prescription/Discharge Order    hydrochlorothiazide-lisinopril  :   hydrochlorothiazide-lisinopril ; Status:   Prescribed ; Ordered As Mnemonic:   hydrochlorothiazide-lisinopril 12.5 mg-20 mg oral tablet ; Simple Display Line:   1 tab(s), PO, Daily, 90 tab(s), 0 Refill(s) ; Ordering Provider:   Pan Armenta MD; Catalog Code:   hydrochlorothiazide-lisinopril ; Order Dt/Tm:   4/29/2019 12:02:35 PM

## 2022-02-16 NOTE — NURSING NOTE
Comprehensive Intake Entered On:  7/2/2019 4:00 PM CDT    Performed On:  7/2/2019 4:00 PM CDT by Kaylah Murray CMA               Summary   Chief Complaint :   Pt here for lesion removal on left shin   Weight Measured :   257 lb(Converted to: 257 lb 0 oz, 116.57 kg)    Height Measured :   71 in(Converted to: 5 ft 11 in, 180.34 cm)    Body Mass Index :   35.84 kg/m2 (HI)    Body Surface Area :   2.41 m2   Systolic Blood Pressure :   138 mmHg (HI)    Diastolic Blood Pressure :   82 mmHg (HI)    Mean Arterial Pressure :   101 mmHg   Peripheral Pulse Rate :   65 bpm   Oxygen Saturation :   98 %   Kaylah Murray CMA - 7/2/2019 4:00 PM CDT   Health Status   Allergies Verified? :   Yes   Medication History Verified? :   Yes   Medical History Verified? :   Yes   Pre-Visit Planning Status :   Completed   Tobacco Use? :   Former smoker   Kaylah Murray CMA - 7/2/2019 4:00 PM CDT   Consents   Consent for Immunization Exchange :   Consent Granted   Consent for Immunizations to Providers :   Consent Granted   Kaylah Murray CMA - 7/2/2019 4:00 PM CDT   Meds / Allergies   (As Of: 7/2/2019 4:00:58 PM CDT)   Allergies (Active)   No known allergies  Estimated Onset Date:   Unspecified ; Created By:   Emani Becerra; Reaction Status:   Active ; Category:   Drug ; Substance:   No known allergies ; Type:   Allergy ; Updated By:   Emani Becerra; Reviewed Date:   7/2/2019 4:00 PM CDT        Medication List   (As Of: 7/2/2019 4:00:58 PM CDT)   Prescription/Discharge Order    amoxicillin-clavulanate  :   amoxicillin-clavulanate ; Status:   Prescribed ; Ordered As Mnemonic:   Augmentin 875 mg-125 mg oral tablet ; Simple Display Line:   1 tab(s), Oral, q12 hrs, for 10 day(s), 20 tab(s), 0 Refill(s) ; Ordering Provider:   Pan Armenta MD; Catalog Code:   amoxicillin-clavulanate ; Order Dt/Tm:   6/25/2019 3:43:56 PM          hydrochlorothiazide-lisinopril  :   hydrochlorothiazide-lisinopril ; Status:   Prescribed ; Ordered As  Mnemonic:   hydrochlorothiazide-lisinopril 12.5 mg-20 mg oral tablet ; Simple Display Line:   1 tab(s), PO, Daily, 90 tab(s), 0 Refill(s) ; Ordering Provider:   Pan Armenta MD; Catalog Code:   hydrochlorothiazide-lisinopril ; Order Dt/Tm:   4/29/2019 12:02:35 PM

## 2022-02-16 NOTE — TELEPHONE ENCOUNTER
ELSY NOYOLA : 1950  PHONE NOTE  The patient was given the results of his gallbladder ultrasound done on 2019.  He does have sludge in the gallbladder but no sonographic evidence of acute cholecystitis.  He is not anxious to pursue a surgical option so I have recommended he modify the fat intake in his diet and if this does eventually get worse and he wants to consult with a surgeon he will give me a call back.  D:  2019  T:  2019 Pan Armenta MD/yasmany

## 2022-02-16 NOTE — TELEPHONE ENCOUNTER
Entered by Kaylah Murray CMA on April 20, 2020 9:40:22 AM CDT  ---------------------  From: Kaylah Murray CMA   To: [place] [address]    Sent: 4/20/2020 9:40:22 AM CDT  Subject: Medication Management     ** Not Approved: Patient needs appointment **  hydrochlorothiazide-lisinopril (Lisinopril-hydroCHLOROthiazide 20-12.5 MG Oral Tablet)  Take 1 tablet by mouth daily  Qty:  90 tab(s)        Days Supply:  90        Refills:  0          Substitutions Allowed     Route To Pharmacy - [place] [address]   Signed by Kaylah Murray CMA            ------------------------------------------  From: [place] [address]  To: Pan Armenta MD  Sent: April 20, 2020 7:17:45 AM CDT  Subject: Medication Management  Due: April 21, 2020 7:17:45 AM CDT    ** On Hold Pending Signature **  Drug: hydrochlorothiazide-lisinopril (hydrochlorothiazide-lisinopril 12.5 mg-20 mg oral tablet)  TAKE 1 TABLET BY MOUTH DAILY  Quantity: 90 EA  Days Supply: 90  Refills: 0  Substitutions Allowed  Notes from Pharmacy:     Dispensed Drug: hydrochlorothiazide-lisinopril (hydrochlorothiazide-lisinopril 12.5 mg-20 mg oral tablet)  Take 1 tablet by mouth daily  Quantity: 90 tab(s)  Days Supply: 90  Refills: 0  Substitutions Allowed  Notes from Pharmacy:   ------------------------------------------

## 2022-02-16 NOTE — LETTER
(Inserted Image. Unable to display)     May 20, 2019      ELSY NOYOLA   [place], [place] [id number]          Dear ELSY,      Thank you for selecting [facility] for your healthcare needs.      Our records indicate you are due for the following services:     Clinical Support Staff (CSS)-Only Blood Pressure Check ~ Please stop in anytime to have your blood pressure rechecked. This is a free service and no appointment necessary.     So we can best determine if your medications are effective in lowering your blood pressure, please make sure your blood pressure medicine has been in your system for at least 1-2 hours prior to coming in.  We encourage you to avoid caffeine or other stimulants prior to having your blood pressure checked and come at a time when you are not feeling rushed.     If you check your blood pressure at home, please bring in your blood pressure monitor and home blood pressure readings.  We will check your machine for accuracy and also share your home readings with your Healthcare Provider.       To schedule an appointment or if you have further questions, please contact your primary clinic:   [facility]       [phone]   [facility]       [phone]              [facility]     [phone]      Powered by [employer]    Sincerely,    Pan Armenta MD

## 2022-02-16 NOTE — PROGRESS NOTES
Patient:   ELSY NOYOLA            MRN: [id number]            FIN: [id number]               Age:   69 years     Sex:  Male     :  1950   Associated Diagnoses:   Acute pansinusitis; Changing skin lesion   Author:   Pan Armenta MD      Impression and Plan   Diagnosis     Acute pansinusitis (DNW22-QW J01.40).     Course:  Worsening.    Orders     Orders   Charges (Evaluation and Management):  45810 office outpatient visit 15 minutes (Charge) (Order): Quantity: 1, Acute pansinusitis.     Orders (Selected)   Prescriptions  Prescribed  Augmentin 875 mg-125 mg oral tablet: = 1 tab(s), Oral, q12 hrs, x 10 day(s), # 20 tab(s), 0 Refill(s), Type: Acute, Pharmacy: [id number], 1 tab(s) Oral q12 hrs,x10 day(s).     Diagnosis     Changing skin lesion (TVL99-FV L98.9).     Plan:  recommended biopsy in the near future.       Visit Information      Date of Service: 2019 03:09 pm  Performing Location: [facility]  Encounter#: [id number]      Primary Care Provider (PCP):  Pan Armenta MD    NPI# [id number]   Visit type:  New symptom.    Accompanied by:  No one.    Source of history:  Self.    History limitation:  None.       Chief Complaint   Chief complaint discussed and confirmed correct.     2019 3:13 PM CDT    Pt here for multiple issues: plugged ears, swollen gland, and spot on leg        History of Present Illness             The patient presents with sinus problem.  The sinus problem is located in the frontal sinus, ethmoid sinus and maxillary sinus.  The sinus problem is characterized by nasal congestion, headache and facial pain.  The severity of the sinus problem is severe.  The sinus problem is constant.  The sinus problem has lasted for 3 month(s).  The context of the sinus problem: occurred in association with illness.  Associated symptoms consist of none.        Review of Systems   Constitutional:  Negative.    Eye:  Negative.    Ear/Nose/Mouth/Throat:  Nasal  congestion, Sinus pain.    Respiratory:  Negative.    Cardiovascular:  Negative.    Gastrointestinal:  Negative.    Genitourinary:  Negative.    Hematology/Lymphatics:  Negative.    Endocrine:  Negative.    Immunologic:  Negative.    Musculoskeletal:  Negative.    Integumentary:  Negative.    Neurologic:  Negative.    Psychiatric:  Negative.    All other systems reviewed and negative      Health Status   Allergies:    Allergic Reactions (Selected)  No known allergies   Medications:  (Selected)   Prescriptions  Prescribed  Augmentin 875 mg-125 mg oral tablet: = 1 tab(s), Oral, q12 hrs, x 10 day(s), # 20 tab(s), 0 Refill(s), Type: Acute, Pharmacy: [id number], 1 tab(s) Oral q12 hrs,x10 day(s)  hydrochlorothiazide-lisinopril 12.5 mg-20 mg oral tablet: 1 tab(s), PO, Daily, # 90 tab(s), 0 Refill(s), Type: Maintenance, Pharmacy: [id number], 1 tab(s) Oral daily   Problem list:    All Problems  Allergic rhinitis / SNOMED CT 113687140 / Confirmed  ED (erectile dysfunction) / SNOMED CT 015384271 / Confirmed  GERD (gastroesophageal reflux disease) / SNOMED CT 475174463 / Confirmed  HTN (hypertension) / SNOMED CT 2398269963 / Confirmed  Impingement syndrome of shoulder / SNOMED CT 394171862 / Confirmed  Obesity / SNOMED CT 7118361020 / Probable  Onychomycosis / SNOMED CT 1577318280 / Confirmed  Osteoarthritis, knee / SNOMED CT 655624917 / Confirmed  Photosensitivity / SNOMED CT 279347610 / Confirmed  Reactive airway disease / SNOMED CT 7520258155 / Confirmed      Histories   Past Medical History:    Active  Allergic rhinitis (732208804)  Impingement syndrome of shoulder (666110136)  HTN (hypertension) (9974409862)  ED (erectile dysfunction) (468953813)  Photosensitivity (801109772)  Obesity (8394761317)  Onychomycosis (9433858040)  Osteoarthritis, knee (858639644)   Family History:    Lymphosarcoma  Mother ()  Stroke  Father ()  Heart attack  Mother ()     Procedure history:     [facility] performed by Rene Talley MD on 5/9/2013 at 63 Years.  Comments:  5/14/2013 3:21 PM CDT - Sera Hines [place]  Normal colonoscopy, repeat in 10 years  Toe reattachment in 1964 at 14 Years.  Comments:  10/1/2015 10:03 AM CDT - Nelia Grady  2nd & 3rd, right foot   Social History:        Alcohol Assessment: Current            Current, Beer, Daily      Tobacco Assessment: Denies Tobacco Use      Exercise and Physical Activity Assessment: Regular exercise            Exercise type: Walking.      Physical Examination   Vital signs reviewed  and within acceptable limits    Vital Signs   6/25/2019 3:13 PM CDT Peripheral Pulse Rate 82 bpm    Systolic Blood Pressure 132 mmHg  HI    Diastolic Blood Pressure 74 mmHg    Mean Arterial Pressure 93 mmHg    Oxygen Saturation 93 %  LOW      Measurements from flowsheet : Measurements   6/25/2019 3:13 PM CDT Height Measured - Standard 71 in    Weight Measured - Standard 257 lb    BSA 2.41 m2    Body Mass Index 35.84 kg/m2  HI      General:  Alert and oriented, No acute distress.    Eye:  Pupils are equal, round and reactive to light, Extraocular movements are intact, Normal conjunctiva.    HENT:  Normocephalic, Tympanic membranes are clear, Normal hearing, Oral mucosa is moist, No pharyngeal erythema.         Sinus: Bilateral, Ethmoid sinus, Frontal sinus, Maxillary sinus, Tenderness.    Neck:  Supple, Non-tender, No lymphadenopathy.    Respiratory:  Lungs are clear to auscultation, Respirations are non-labored, Breath sounds are equal.    Cardiovascular:  Normal rate, Regular rhythm, No murmur, Normal peripheral perfusion, No edema.    Integumentary:  Warm, Dry, Pink.    Psychiatric:  Cooperative, Appropriate mood & affect, Normal judgment.

## 2022-02-16 NOTE — NURSING NOTE
Comprehensive Intake Entered On:  6/25/2019 3:16 PM CDT    Performed On:  6/25/2019 3:13 PM CDT by Kaylah Murray CMA               Summary   Chief Complaint :   Pt here for multiple issues: plugged ears, swollen gland, and spot on leg   Weight Measured :   257 lb(Converted to: 257 lb 0 oz, 116.57 kg)    Height Measured :   71 in(Converted to: 5 ft 11 in, 180.34 cm)    Body Mass Index :   35.84 kg/m2 (HI)    Body Surface Area :   2.41 m2   Systolic Blood Pressure :   132 mmHg (HI)    Diastolic Blood Pressure :   74 mmHg   Mean Arterial Pressure :   93 mmHg   Peripheral Pulse Rate :   82 bpm   Oxygen Saturation :   93 % (LOW)    Kaylah Murray CMA - 6/25/2019 3:13 PM CDT   Health Status   Allergies Verified? :   Yes   Medication History Verified? :   Yes   Medical History Verified? :   Yes   Pre-Visit Planning Status :   Completed   Tobacco Use? :   Former smoker   Kaylah Murray CMA - 6/25/2019 3:13 PM CDT   Consents   Consent for Immunization Exchange :   Consent Granted   Consent for Immunizations to Providers :   Consent Granted   Kaylah Murray CMA - 6/25/2019 3:13 PM CDT   Meds / Allergies   (As Of: 6/25/2019 3:16:09 PM CDT)   Allergies (Active)   No known allergies  Estimated Onset Date:   Unspecified ; Created By:   Emani Becerra; Reaction Status:   Active ; Category:   Drug ; Substance:   No known allergies ; Type:   Allergy ; Updated By:   Emani Becerra; Reviewed Date:   6/25/2019 3:14 PM CDT        Medication List   (As Of: 6/25/2019 3:16:09 PM CDT)   Prescription/Discharge Order    hydrochlorothiazide-lisinopril  :   hydrochlorothiazide-lisinopril ; Status:   Prescribed ; Ordered As Mnemonic:   hydrochlorothiazide-lisinopril 12.5 mg-20 mg oral tablet ; Simple Display Line:   1 tab(s), PO, Daily, 90 tab(s), 0 Refill(s) ; Ordering Provider:   Pan Armenta MD; Catalog Code:   hydrochlorothiazide-lisinopril ; Order Dt/Tm:   4/29/2019 12:02:35 PM

## 2022-02-16 NOTE — TELEPHONE ENCOUNTER
Entered by Kaylah Murray CMA on July 29, 2019 8:03:50 AM CDT  ---------------------  From: Kaylah Murray CMA   To: [place] [address]    Sent: 7/29/2019 8:03:50 AM CDT  Subject: Medication Management     ** Submitted: **  Order:hydrochlorothiazide-lisinopril (hydrochlorothiazide-lisinopril 12.5 mg-20 mg oral tablet)  1 tab(s)  Oral  daily  Qty:  90 tab(s)        Days Supply:  90        Refills:  1          Substitutions Allowed     Route To Pharmacy [place] [address]    Signed by Kaylah Murray CMA  7/29/2019 8:03:00 AM    ** Submitted: **  Complete:hydrochlorothiazide-lisinopril (hydrochlorothiazide-lisinopril 12.5 mg-20 mg oral tablet)   Signed by Kaylah Murray CMA  7/29/2019 8:03:00 AM    ** Not Approved:  **  hydrochlorothiazide-lisinopril (LISINOPRIL/HCTZ 20-12.5MG TAB)  TAKE 1 TABLET BY MOUTH ONCE DAILY  Qty:  90 tab(s)        Days Supply:  90        Refills:  0          Substitutions Allowed     Route To [place] [address]   Signed by Kaylah Murray CMA            ------------------------------------------  From: [place] [address]  To: Pan Armenta MD  Sent: July 29, 2019 6:12:55 AM CDT  Subject: Medication Management  Due: July 30, 2019 6:12:55 AM CDT    ** On Hold Pending Signature **  Drug: hydrochlorothiazide-lisinopril (hydrochlorothiazide-lisinopril 12.5 mg-20 mg oral tablet)  1 tab(s) Oral daily  Quantity: 90 tab(s)     Days Supply: 0         Refills: 0  Substitutions Allowed  Notes from Pharmacy:     Dispensed Drug: hydrochlorothiazide-lisinopril (hydrochlorothiazide-lisinopril 12.5 mg-20 mg oral tablet)  TAKE 1 TABLET BY MOUTH ONCE DAILY  Quantity: 90 tab(s)     Days Supply: 90        Refills: 0  Substitutions Allowed  Notes from Pharmacy:   ------------------------------------------

## 2022-02-16 NOTE — LETTER
(Inserted Image. Unable to display)   [address] [place]  [address]  April 07, 2021                      ELSY NOYOLA       [facility] [place] [address]        Dear ELSY,    Thank you for selecting [facility] for your health care needs.  Below you will find the results of your recent test(s) done at our clinic.     Your recent tests look good.  The bad cholesterol or LDL is up from the last check.  Please follow a heart healthy diet.  Cholesterol medication is not needed at this point.      Result Name Current Result Previous Result Reference Range   Sodium Level (mmol/L) ((L)) 133 4/5/2021  139 8/8/2019 135 - 146   Potassium Level (mmol/L)  4.3 4/5/2021  4.0 8/8/2019 3.5 - 5.3   Chloride Level (mmol/L)  100 4/5/2021  105 8/8/2019 98 - 110   CO2 Level (mmol/L)  28 4/5/2021  28 8/8/2019 20 - 32   Glucose Level (mg/dL) ((H)) 110 4/5/2021 ((H)) 125 8/8/2019 65 - 99   BUN (mg/dL)  23 4/5/2021 ((H)) 33 8/8/2019 7 - 25   Creatinine Level (mg/dL)  0.87 4/5/2021  1.16 8/8/2019 0.70 - 1.25   eGFR (mL/min/1.73m2)  88 4/5/2021  65 8/8/2019 > OR = 60 -    Calcium Level (mg/dL)  9.9 4/5/2021  9.5 8/8/2019 8.6 - 10.3   Cholesterol (mg/dL)  198 4/5/2021   - <200   Non-HDL Cholesterol ((H)) 156 4/5/2021   - <130   HDL (mg/dL)  42 4/5/2021  > OR = 40 -    Cholesterol/HDL Ratio  4.7 4/5/2021   - <5.0   LDL ((H)) 131 4/5/2021     Triglyceride (mg/dL)  133 4/5/2021   - <150       Please contact me or my assistant at [phone] if you have any questions about your results.    Sincerely,        Damian Kennedy MD        What do your labs mean?  Below is a glossary of commonly ordered labs:  LDL   Bad Cholesterol   HDL   Good Cholesterol  AST/ALT   Liver Function   Cr/Creatinine   Kidney Function  Microalbumin   Kidney Function  BUN   Kidney Function  PSA   Prostate    TSH   Thyroid Hormone  HgbA1c   Diabetes Test   Hgb (Hemoglobin)   Red Blood Cells

## 2022-02-16 NOTE — TELEPHONE ENCOUNTER
Patient would like to have Ultrasound Gallbladder done at [facility]. Referral brought over to them 01/22/2019.   They will contact patient to schedule appointment.

## 2022-02-16 NOTE — LETTER
(Inserted Image. Unable to display)   [address] [facility] [address]  January 23, 2019      ELSY NOYOLA   [place], [place] [id number]        Dear ELSY,     Thank you for selecting [facility] for your healthcare needs. Below you will find the results of the recent tests done at our clinic.       All results are within acceptable limits. No treatment changes are recommended at this time.      Result Name Current Result Previous Result Reference Range   Sodium Level (mmol/L)  137 1/22/2019  138 5/7/2018 135 - 146   Potassium Level (mmol/L)  4.2 1/22/2019  4.1 5/7/2018 3.5 - 5.3   Chloride Level (mmol/L)  103 1/22/2019  104 5/7/2018 98 - 110   CO2 Level (mmol/L)  27 1/22/2019  26 5/7/2018 20 - 32   Glucose Level (mg/dL) ((H)) 108 1/22/2019  87 5/7/2018 65 - 99   BUN (mg/dL)  17 1/22/2019  18 5/7/2018 7 - 25   Creatinine Level (mg/dL)  0.75 1/22/2019  1.00 5/7/2018 0.70 - 1.25   BUN/Creat Ratio  NOT APPLICABLE 1/22/2019  NOT APPLICABLE 5/7/2018 6 - 22   eGFR (mL/min/1.73m2)  96 1/22/2019  78 5/7/2018 > OR = 60 -    eGFR  (mL/min/1.73m2)  111 1/22/2019  90 5/7/2018 > OR = 60 -    Calcium Level (mg/dL)  9.2 1/22/2019  9.3 5/7/2018 8.6 - 10.3   Bilirubin Total (mg/dL)  0.8 1/22/2019  0.5 5/7/2018 0.2 - 1.2   Alkaline Phosphatase (unit/L)  63 1/22/2019  61 5/7/2018 40 - 115   AST/SGOT (unit/L)  23 1/22/2019  14 5/7/2018 10 - 35   ALT/SGPT (unit/L)  41 1/22/2019  22 5/7/2018 9 - 46   Protein Total (gm/dL)  6.7 1/22/2019  6.9 5/7/2018 6.1 - 8.1   Albumin Level (gm/dL)  4.2 1/22/2019  4.3 5/7/2018 3.6 - 5.1   Globulin  2.5 1/22/2019  2.6 5/7/2018 1.9 - 3.7   A/G Ratio  1.7 1/22/2019  1.7 5/7/2018 1.0 - 2.5   Cholesterol (mg/dL)  184 1/22/2019  174 10/4/2017  - <200   Non-HDL Cholesterol ((H)) 136 1/22/2019  129 10/4/2017  - <130   HDL (mg/dL)  48 1/22/2019  45 10/4/2017 >40 -    Cholesterol/HDL Ratio  3.8 1/22/2019  3.9 10/4/2017  - <5.0   LDL ((H)) 119  1/22/2019 ((H)) 112 10/4/2017    Triglyceride (mg/dL)  75 1/22/2019  81 10/4/2017  - <150   PSA (ng/mL)  0.9 1/22/2019  0.8 10/4/2017  - < OR = 4.0   WBC  5.9 1/22/2019  8.1 5/7/2018 3.8 - 10.8   RBC  4.86 1/22/2019  4.46 5/7/2018 4.20 - 5.80   Hgb (gm/dL)  14.2 1/22/2019 ((L)) 12.9 5/7/2018 13.2 - 17.1   Hct (%)  41.5 1/22/2019  38.9 5/7/2018 38.5 - 50.0   MCV (fL)  85.4 1/22/2019  87.2 5/7/2018 80.0 - 100.0   MCH (pg)  29.2 1/22/2019  28.9 5/7/2018 27.0 - 33.0   MCHC (gm/dL)  34.2 1/22/2019  33.2 5/7/2018 32.0 - 36.0   RDW (%)  13.2 1/22/2019  13.6 5/7/2018 11.0 - 15.0   Platelet  277 1/22/2019  261 5/7/2018 140 - 400   MPV (fL)  10.7 1/22/2019  10.5 5/7/2018 7.5 - 12.5       Please contact my practice at [phone]  if you have any questions or concerns.     Sincerely,        Pan Armenta MD          What do your labs mean?  Below is a glossary of commonly ordered labs:  LDL   Bad Cholesterol   HDL   Good Cholesterol  AST/ALT   Liver Function   Cr/Creatinine   Kidney Function  Microalbumin   Kidney Function  BUN   Kidney Function  PSA   Prostate    TSH   Thyroid Hormone  HgbA1c   Diabetes Test   Hgb (Hemoglobin)   Red Blood Cells

## 2022-02-16 NOTE — NURSING NOTE
Comprehensive Intake Entered On:  10/21/2019 1:04 PM CDT    Performed On:  10/21/2019 1:01 PM CDT by Kaylah Murray CMA               Summary   Chief Complaint :   Pt here with cold sx   Weight Measured :   249 lb(Converted to: 249 lb 0 oz, 112.94 kg)    Height Measured :   71 in(Converted to: 5 ft 11 in, 180.34 cm)    Body Mass Index :   34.72 kg/m2 (HI)    Body Surface Area :   2.38 m2   Systolic Blood Pressure :   130 mmHg   Diastolic Blood Pressure :   74 mmHg   Mean Arterial Pressure :   93 mmHg   Peripheral Pulse Rate :   74 bpm   Temperature Tympanic :   97.1 DegF(Converted to: 36.2 DegC)  (LOW)    Oxygen Saturation :   97 %   Kaylah Murray CMA - 10/21/2019 1:01 PM CDT   Health Status   Allergies Verified? :   Yes   Medication History Verified? :   Yes   Medical History Verified? :   Yes   Pre-Visit Planning Status :   Completed   Tobacco Use? :   Former smoker   Kaylah Murray CMA - 10/21/2019 1:01 PM CDT   Consents   Consent for Immunization Exchange :   Consent Granted   Consent for Immunizations to Providers :   Consent Granted   Kaylah Murray CMA - 10/21/2019 1:01 PM CDT   Meds / Allergies   (As Of: 10/21/2019 1:04:01 PM CDT)   Allergies (Active)   No known allergies  Estimated Onset Date:   Unspecified ; Created By:   Emani Becerra CMA; Reaction Status:   Active ; Category:   Drug ; Substance:   No known allergies ; Type:   Allergy ; Updated By:   Emani Becerra CMA; Reviewed Date:   10/21/2019 1:01 PM CDT        Medication List   (As Of: 10/21/2019 1:04:01 PM CDT)   Prescription/Discharge Order    doxycycline  :   doxycycline ; Status:   Processing ; Ordered As Mnemonic:   doxycycline hyclate 100 mg oral tablet ; Ordering Provider:   Pan Armenta MD; Action Display:   Complete ; Catalog Code:   doxycycline ; Order Dt/Tm:   10/21/2019 1:01:46 PM CDT          hydrochlorothiazide-lisinopril  :   hydrochlorothiazide-lisinopril ; Status:   Prescribed ; Ordered As Mnemonic:    hydrochlorothiazide-lisinopril 12.5 mg-20 mg oral tablet ; Simple Display Line:   1 tab(s), Oral, daily, 90 tab(s), 1 Refill(s) ; Ordering Provider:   Pan Armenta MD; Catalog Code:   hydrochlorothiazide-lisinopril ; Order Dt/Tm:   7/29/2019 8:03:31 AM CDT            Home Meds    aspirin  :   aspirin ; Status:   Documented ; Ordered As Mnemonic:   aspirin 81 mg oral delayed release tablet ; Simple Display Line:   81 mg, 1 tab(s), Oral, daily, 0 Refill(s) ; Catalog Code:   aspirin ; Order Dt/Tm:   8/8/2019 3:34:04 PM CDT

## 2022-02-16 NOTE — PROGRESS NOTES
Patient:   ELSY NOYOLA            MRN: [id number]            FIN: [id number]               Age:   67 years     Sex:  Male     :  1950   Associated Diagnoses:   None   Author:   Pan Armenta MD      Impression and Plan   Diagnosis   Plan:  Eye exam yearly: UTD  Dental exam yearly: UTD  Hearing exam yearly: UTD  Depression Screening yearly: UTD  Prostate screen male every year: UTD  Colorectal yearly (every ten years colonoscopy): UTD  Influenza vaccine yearly: UTD  Prevnar 13 once age 65: UTD  Adacel every ten years: UTD  Lipid screen every five years: UTD  Fasting Glucose every five years: UTD.    Orders     Orders   Charges (Evaluation and Management):  47351 periodic preventive med est patient 65yrs+> (Charge) (Order): Quantity: 1, Well adult exam.     Orders (Selected)   Outpatient Orders  Ordered (Dispatched)  CBC (h/h, RBC, indices, WBC, Plt)* ([facility]): Specimen Type: Blood, Collection Date: 10/04/17 9:16:00 CDT  Comprehensive Metabolic Panel* ([facility]): Specimen Type: Serum, Collection Date: 10/04/17 9:16:00 CDT  Hemoglobin A1c* ([facility]): Specimen Type: Blood, Collection Date: 10/04/17 9:16:00 CDT  Lipid panel with reflex to direct ldl* ([facility]): Specimen Type: Serum, Collection Date: 10/04/17 9:16:00 CDT  PSA, Total (Room)* ([facility]): Specimen Type: Serum, Collection Date: 10/04/17 9:16:00 CDT.        Visit Information      Date of Service: 10/04/2017 08:57 am  Performing Location: [facility]  Encounter#: [id number]      Primary Care Provider (PCP):  Pan Armenta MD    NPI# [id number]      Referring Provider:  Pan Armenta MD    NPI# [id number]   Visit type:  Annual exam.    Accompanied by:  No one.    Source of history:  Self.    History limitation:  None.       Chief Complaint   Chief complaint discussed and confirmed correct.     10/4/2017 9:02 AM CDT    Pt here for px        Well Adult History   Well Adult History             The patient presents for well adult exam.  The  patient's general health status is described as good.  The patient's diet is described as balanced.  Exercise: routine.  Associated symptoms consist of none.  Medical encounters: none.  Compliance problems: none.        Review of Systems   Constitutional:  Negative.    Eye:  Negative.    Ear/Nose/Mouth/Throat:  Nasal congestion, Sore throat.    Respiratory:  Negative.    Cardiovascular:  Negative.    Gastrointestinal:  Heartburn.    Genitourinary:  Nocturia.    Hematology/Lymphatics:  Negative.    Endocrine:  Negative.    Immunologic:  Negative.    Musculoskeletal:  Joint pain, Muscle pain.    Integumentary:  Skin lesion, biopsy recommended for any changing skin lesions.    Neurologic:  Negative.    Psychiatric:  Negative.    All other systems reviewed and negative      Health Status   Allergies:    Allergic Reactions (Selected)  No known allergies   Medications:  (Selected)   Prescriptions  Prescribed  azithromycin 500 mg oral tablet: 1 tab(s) ( 500 mg ), PO, Daily, # 10 tab(s), 0 Refill(s), Type: Maintenance, Pharmacy: [facility], 1 tab(s) po daily,x10 day(s)  losartan 100 mg oral tablet: 1 tab(s) ( 100 mg ), po, daily, # 90 tab(s), 1 Refill(s), Type: Maintenance, Pharmacy: [id number], Pt due for appt for refills, 1 tab(s) po daily  predniSONE 20 mg oral tablet: 1 tab(s) ( 20 mg ), PO, bid, # 14 tab(s), 0 Refill(s), Type: Maintenance, Pharmacy: [facility], 1 tab(s) po bid,x7 day(s)  Documented Medications  Documented  aspirin 81 mg oral enteric coated tablet: 1 tab(s) ( 81 mg ), PO, Daily, 0   Problem list:    All Problems  Allergic rhinitis / SNOMED CT 748088753 / Confirmed  ED (erectile dysfunction) / SNOMED CT 478063508 / Confirmed  GERD (gastroesophageal reflux disease) / SNOMED CT 054431669 / Confirmed  HTN (hypertension) / SNOMED CT 8884294581 / Confirmed  Impingement syndrome of shoulder / SNOMED CT 974735379 / Confirmed  Obesity / SNOMED CT 0515947492 / Probable  Onychomycosis /  SNOMED CT 1302261728 / Confirmed  Osteoarthritis, knee / SNOMED CT 351617696 / Confirmed  Photosensitivity / SNOMED CT 561003089 / Confirmed  Reactive airway disease / SNOMED CT 4866560860 / Confirmed      Histories   Past Medical History:    Active  Allergic rhinitis (243022986)  Impingement syndrome of shoulder (802560723)  HTN (hypertension) (9355061726)  ED (erectile dysfunction) (466831385)  Photosensitivity (327973870)  Obesity (0967065253)  Onychomycosis (8406162707)  Osteoarthritis, knee (996468092)   Family History:    Lymphosarcoma  Mother ()  Stroke  Father ()  Heart attack  Mother ()     Procedure history:    Colonoscopy (SNOMED CT 475169823) performed by Rene Talley MD on 2013 at 63 Years.  Comments:  2013 3:21 PM - Uziel Hines MA  Normal colonoscopy, repeat in 10 years  Toe reattachment in 1964 at 14 Years.  Comments:  10/1/2015 10:03 AM - Nelia Grady  2nd & 3rd, right foot   Social History:        Alcohol Assessment: Current            Current, Beer, Daily      Tobacco Assessment: Denies Tobacco Use      Exercise and Physical Activity Assessment: Regular exercise            Exercise type: Walking.        Physical Examination   Vital signs reviewed  and within acceptable limits    Vital Signs   10/4/2017 9:02 AM CDT Peripheral Pulse Rate 61 bpm    Pulse Site Radial artery    Systolic Blood Pressure 136 mmHg    Diastolic Blood Pressure 84 mmHg    Mean Arterial Pressure 101 mmHg    BP Site Right arm    Oxygen Saturation 99 %      Measurements from flowsheet : Measurements   10/4/2017 9:02 AM CDT Height Measured - Standard 71 in    Weight Measured - Standard 248.8 lb    BSA 2.38 m2    Body Mass Index 34.7 kg/m2      General:  Alert and oriented, No acute distress.    Eye:  Pupils are equal, round and reactive to light, Extraocular movements are intact, Normal conjunctiva.    HENT:  Normocephalic, Tympanic membranes are clear, Normal hearing, Oral mucosa is  moist, No pharyngeal erythema.    Neck:  Supple, Non-tender, No carotid bruit, No jugular venous distention, No lymphadenopathy, No thyromegaly.    Respiratory:  Lungs are clear to auscultation, Respirations are non-labored, Breath sounds are equal, Symmetrical chest wall expansion, No chest wall tenderness.    Cardiovascular:  Normal rate, Regular rhythm, No murmur, No gallop, Good pulses equal in all extremities, Normal peripheral perfusion, No edema.    Gastrointestinal:  Soft, Non-tender, Non-distended, Normal bowel sounds, No organomegaly.    Genitourinary:  prostate normal size and shape. No nodules. No tenderness. Rectum otherwise normal..    Lymphatics:  No lymphadenopathy neck, axilla, groin.    Musculoskeletal:  Normal range of motion, Normal strength, No tenderness, No swelling, No deformity, Normal gait.    Integumentary:  Warm, Dry, Pink.    Neurologic:  Normal sensory, Normal motor function, No focal deficits, Cranial Nerves II-XII are grossly intact, Normal deep tendon reflexes.    Psychiatric:  Cooperative, Appropriate mood & affect, Normal judgment.

## 2022-02-16 NOTE — NURSING NOTE
Depression Screening Entered On:  1/22/2019 9:03 AM CST    Performed On:  1/22/2019 9:03 AM CST by Kaylah Murray CMA               Depression Screening   Feeling Down, Depressed, Hopeless :   Not at all   Little Interest - Pleasure in Activities :   Not at all   Initial Depression Screen Score :   0    Trouble Falling or Staying Asleep :   Not at all   Feeling Tired or Little Energy :   Not at all   Poor Appetite or Overeating :   Not at all   Feeling Bad About Yourself :   Not at all   Trouble Concentrating :   Not at all   Moving or Speaking Slowly :   Not at all   Thoughts Better Off Dead or Hurting Self :   Not at all   Detailed Depression Screen Score :   0    Total Depression Screen Score :   0    NAFISA Difficulty with Work, Home, Others :   Not difficult at all   Kaylah Murray CMA - 1/22/2019 9:03 AM CST

## 2022-02-16 NOTE — PROGRESS NOTES
Patient:   ELSY NOYOLA            MRN: [id number]            FIN: [id number]               Age:   68 years     Sex:  Male     :  1950   Associated Diagnoses:   RUQ abdominal pain   Author:   Pan Armenta MD      Impression and Plan   Diagnosis     RUQ abdominal pain ([id number] R10.11).     Differential: 1. Umbilical Hernia 2. Gallstones 3. Duodenitis.     Course:  Worsening.    Plan:    1. Gallbladder US  2. Prilosec 20 mg PO BID  3. Consider General Surgery Consult for Umbilical Hernia Repair if 1 & 2 negative.    Orders     Orders   Charges (Evaluation and Management):  42973 office outpatient visit 25 minutes (Charge) (Order): Quantity: 1, RUQ abdominal pain.     Orders (Selected)   Outpatient Orders  Ordered  US Gallbladder (Request): RUQ abdominal pain.        Visit Information   Visit type:  New symptom.    Accompanied by:  No one.    Source of history:  Self.    History limitation:  None.       Chief Complaint   10/22/2018 1:35 PM CDT   c/o stomach pain x1 month, has also noticed increased gas/burping/acid reflux.        History of Present Illness             The patient presents with abdominal pain.  The abdominal pain is localized and located in the right upper quadrant.  The abdominal pain is described as aching.  The severity of the abdominal pain is mild and is rated 2 / 10 on the pain scale.  The abdominal pain is episodic and fluctuates in intensity.  The abdominal pain has lasted for 1 month(s).  Radiation of pain: none.  There are no modifying factors.  Associated symptoms consist of denies fever, denies nausea, denies vomiting, denies diarrhea, denies constipation, denies abdominal distention and denies dysuria.        Review of Systems   Constitutional:  Negative.    Eye:  Negative.    Ear/Nose/Mouth/Throat:  Negative.    Respiratory:  Negative.    Cardiovascular:  Negative.    Gastrointestinal:  Heartburn, Abdominal pain.    Genitourinary:  Negative.    Hematology/Lymphatics:   Negative.    Endocrine:  Negative.    Immunologic:  Negative.    Musculoskeletal:  Negative.    Integumentary:  Negative.    Neurologic:  Negative.    Psychiatric:  Negative.    All other systems reviewed and negative      Health Status   Allergies:    Allergic Reactions (Selected)  No known allergies   Medications:  (Selected)   Prescriptions  Prescribed  Viagra 100 mg oral tablet: 1 tab(s) ( 100 mg ), PO, Daily, # 20 tab(s), 0 Refill(s), Type: Maintenance, Pharmacy: [facility], 1 tab(s) po daily  losartan 100 mg oral tablet: 1 tab(s) ( 100 mg ), po, daily, # 90 tab(s), 1 Refill(s), Type: Maintenance, Pharmacy: [id number], 1 tab(s) po daily  Documented Medications  Documented  aspirin 81 mg oral enteric coated tablet: 1 tab(s) ( 81 mg ), PO, Daily, 0   Problem list:    All Problems  Allergic rhinitis / SNOMED CT 995714062 / Confirmed  ED (erectile dysfunction) / SNOMED CT 113547309 / Confirmed  GERD (gastroesophageal reflux disease) / SNOMED CT 002100437 / Confirmed  HTN (hypertension) / SNOMED CT 9286895693 / Confirmed  Impingement syndrome of shoulder / SNOMED CT 821932320 / Confirmed  Obesity / SNOMED CT 4349332952 / Probable  Onychomycosis / SNOMED CT 3375329789 / Confirmed  Osteoarthritis, knee / SNOMED CT 047810951 / Confirmed  Photosensitivity / SNOMED CT 164725360 / Confirmed  Reactive airway disease / SNOMED CT 5866919678 / Confirmed      Histories   Past Medical History:    Active  Allergic rhinitis (333299910)  Impingement syndrome of shoulder (233681672)  HTN (hypertension) (2978154709)  ED (erectile dysfunction) (965276396)  Photosensitivity (450353529)  Obesity (4738514974)  Onychomycosis (9984750370)  Osteoarthritis, knee (137801877)   Family History:    Lymphosarcoma  Mother ()  Stroke  Father ()  Heart attack  Mother ()     Procedure history:    Colonoscopy (SNOMED CT 911727032) performed by Rene Talley MD on 2013 at 63 Years.  Comments:  2013  3:21 PM - Uziel Hines MA  Normal colonoscopy, repeat in 10 years  Toe reattachment in 1964 at 14 Years.  Comments:  10/1/2015 10:03 AM - Nelia Grady  2nd & 3rd, right foot      Physical Examination   Vital Signs   10/22/2018 1:35 PM CDT Temperature Tympanic 97.8 DegF  LOW    Peripheral Pulse Rate 81 bpm    Pulse Site Radial artery    HR Method Electronic    Systolic Blood Pressure 134 mmHg  HI    Diastolic Blood Pressure 70 mmHg    Mean Arterial Pressure 91 mmHg    BP Site Right arm    BP Method Manual    Oxygen Saturation 96 %      Measurements from flowsheet : Measurements   10/22/2018 1:35 PM CDT Height Measured - Standard 71 in    Weight Measured - Standard 246.0 lb    BSA 2.36 m2    Body Mass Index 34.31 kg/m2  HI      General:  No acute distress.    Neck:  Supple, No lymphadenopathy, No thyromegaly.    Respiratory:  Lungs are clear to auscultation, Respirations are non-labored, Breath sounds are equal, Symmetrical chest wall expansion.    Cardiovascular:  Normal rate, Regular rhythm, No murmur, No gallop, Good pulses equal in all extremities, Normal peripheral perfusion, No edema.    Gastrointestinal:  Soft, Non-tender, Non-distended, Normal bowel sounds, No organomegaly, No gaurding or rebound or percussion tenderness, Does have small Umbilical Hernia which is mildly tender to palpation..    Integumentary:  Warm, Dry, Pink.    Neurologic:  Alert, Oriented.    Psychiatric:  Cooperative, Appropriate mood & affect.

## 2022-02-16 NOTE — PROGRESS NOTES
Patient:   ELSY NOYOLA            MRN: [id number]            FIN: [id number]               Age:   69 years     Sex:  Male     :  1950   Associated Diagnoses:   Visit for suture removal   Author:   Pan Armenta MD      Impression and Plan   Diagnosis     Visit for suture removal ([id number] Z48.02).     Orders     Orders (Selected)   Outpatient Orders  Order   Suture/Staple Removal, Simple (Charge): Quantity: 1, Visit for suture removal.     Counseled:  Patient, Regarding diagnosis, Regarding treatment.       Visit Information      Date of Service: 07/15/2019 03:34 pm  Performing Location: [facility]  Encounter#: [id number]      Primary Care Provider (PCP):  Pan Armenta MD    NPI# [id number]      Chief Complaint   7/15/2019 3:35 PM CDT    Pt here for suture removal        Physical Examination   Vital Signs   7/15/2019 3:35 PM CDT Peripheral Pulse Rate 66 bpm    Systolic Blood Pressure 118 mmHg    Diastolic Blood Pressure 62 mmHg    Mean Arterial Pressure 81 mmHg    Oxygen Saturation 99 %      Measurements from flowsheet : Measurements   7/15/2019 3:35 PM CDT Height Measured - Standard 71 in    Weight Measured - Standard 257 lb    BSA 2.41 m2    Body Mass Index 35.84 kg/m2  HI         Procedure   Staple/ Suture removal procedure   Date/ Time:  7/15/2019 3:51:00 PM.     Confirmed: patient, procedure, side, site, safety procedures followed.     Performed by: Pan Armenta MD.     Wound assessment:: the left lower leg, normal skin color, characteristics well healed, discharge none, inflammatory changes none, course (improving, progressing as expected).     Insertion/ Removal: Date sutured  2019, Number of sutures removed  5.     Steri-strips applied: 3 strips.     Procedure tolerated: well.     Complications: none.

## 2022-02-16 NOTE — PROGRESS NOTES
Patient:   ELSY NOYOLA            MRN: [id number]            FIN: [id number]               Age:   69 years     Sex:  Male     :  1950   Associated Diagnoses:   Acute lower respiratory infection   Author:   Pan Armenta MD      Impression and Plan   Diagnosis     Acute lower respiratory infection ([id number]).     Course:  Worsening.    Orders     Orders   Charges (Evaluation and Management):  58360 office outpatient visit 15 minutes (Charge) (Order): Quantity: 1, Acute lower respiratory infection.     Orders (Selected)   Prescriptions  Prescribed  azithromycin 500 mg oral tablet: = 1 tab(s) ( 500 mg ), PO, Daily, # 7 tab(s), 0 Refill(s), Type: Maintenance, Pharmacy: [facility], 1 tab(s) Oral daily,x7 day(s).        Visit Information      Date of Service: 10/21/2019 12:59 pm  Performing Location: [facility]  Encounter#: [id number]      Primary Care Provider (PCP):  Pan Armenta MD    NPI# [id number]   Visit type:  New symptom.    Accompanied by:  No one.    Source of history:  Self.    History limitation:  None.       Chief Complaint   Chief complaint discussed and confirmed correct.     10/21/2019 1:01 PM CDT   Pt here with cold sx        History of Present Illness             The patient presents with cough.  The cough is described as paroxysmal and productive yellow sputum.  The severity of the cough is severe.  The cough is worsening.  The cough has lasted for 10 day(s).  The context of the cough: occurred in association with illness.  There are no modifying factors.  Associated symptoms consist of none.        Review of Systems   Constitutional:  Negative.    Eye:  Negative.    Ear/Nose/Mouth/Throat:  Negative.    Respiratory:  Cough.    Cardiovascular:  Negative.    Gastrointestinal:  Negative.    Genitourinary:  Negative.    Hematology/Lymphatics:  Negative.    Endocrine:  Negative.    Immunologic:  Negative.    Musculoskeletal:  Negative.    Integumentary:   Negative.    Neurologic:  Negative.    Psychiatric:  Negative.    All other systems reviewed and negative      Health Status   Allergies:    Allergic Reactions (Selected)  No known allergies   Medications:  (Selected)   Prescriptions  Prescribed  azithromycin 500 mg oral tablet: = 1 tab(s) ( 500 mg ), PO, Daily, # 7 tab(s), 0 Refill(s), Type: Maintenance, Pharmacy: [facility], 1 tab(s) Oral daily,x7 day(s)  hydrochlorothiazide-lisinopril 12.5 mg-20 mg oral tablet: 1 tab(s), Oral, daily, # 90 tab(s), 1 Refill(s), Type: Maintenance, Pharmacy: [facility]  Documented Medications  Documented  aspirin 81 mg oral delayed release tablet: = 1 tab(s) ( 81 mg ), Oral, daily, 0 Refill(s), Type: Maintenance   Problem list:    All Problems  Allergic rhinitis / SNOMED CT 138910883 / Confirmed  ED (erectile dysfunction) / SNOMED CT 402675127 / Confirmed  GERD (gastroesophageal reflux disease) / SNOMED CT 009324466 / Confirmed  HTN (hypertension) / SNOMED CT 4739540713 / Confirmed  Impingement syndrome of shoulder / SNOMED CT 088860265 / Confirmed  Obesity / SNOMED CT 4145424128 / Probable  Onychomycosis / SNOMED CT 5773212181 / Confirmed  Osteoarthritis, knee / SNOMED CT 866419678 / Confirmed  Photosensitivity / SNOMED CT 833898151 / Confirmed  Reactive airway disease / SNOMED CT 7981470344 / Confirmed      Histories   Past Medical History:    Active  Allergic rhinitis (197210378)  Impingement syndrome of shoulder (596180050)  HTN (hypertension) (7971404343)  ED (erectile dysfunction) (531132202)  Photosensitivity (202787258)  Obesity (5352862638)  Onychomycosis (9900009875)  Osteoarthritis, knee (751104406)   Family History:    Lymphosarcoma  Mother ()  Stroke  Father ()  Heart attack  Mother ()     Procedure history:    Colonoscopy (SNOMED CT 762268715) performed by Rene Talley MD on 2013 at 63 Years.  Comments:  2013 3:21 PM SHAYY - Uziel Hines MA  Normal colonoscopy,  repeat in 10 years  Toe reattachment in 1964 at 14 Years.  Comments:  10/1/2015 10:03 AM CDT - Nelia Grady  2nd & 3rd, right foot   Social History:        Alcohol Assessment: Current            Current, Beer, Daily      Tobacco Assessment: Denies Tobacco Use      Exercise and Physical Activity Assessment: Regular exercise            Exercise type: Walking.        Physical Examination   Vital signs reviewed  and within acceptable limits    Vital Signs   10/21/2019 1:01 PM CDT Temperature Tympanic 97.1 DegF  LOW    Peripheral Pulse Rate 74 bpm    Systolic Blood Pressure 130 mmHg    Diastolic Blood Pressure 74 mmHg    Mean Arterial Pressure 93 mmHg    Oxygen Saturation 97 %      Measurements from flowsheet : Measurements   10/21/2019 1:01 PM CDT Height Measured - Standard 71 in    Weight Measured - Standard 249 lb    BSA 2.38 m2    Body Mass Index 34.72 kg/m2  HI      General:  Alert and oriented, No acute distress.    Eye:  Pupils are equal, round and reactive to light, Extraocular movements are intact, Normal conjunctiva.    HENT:  Normocephalic, Tympanic membranes are clear, Normal hearing, Oral mucosa is moist, No pharyngeal erythema, No sinus tenderness.    Neck:  Supple, Non-tender, No lymphadenopathy, No thyromegaly.    Respiratory:  Lungs are clear to auscultation, Respirations are non-labored, Breath sounds are equal, demonstrates frequent loose cough.    Cardiovascular:  Normal rate, Regular rhythm, No murmur, Normal peripheral perfusion, No edema.    Integumentary:  Warm, Dry, Pink.    Psychiatric:  Cooperative, Appropriate mood & affect, Normal judgment.

## 2022-02-16 NOTE — NURSING NOTE
CAGE Assessment Entered On:  1/22/2019 9:03 AM CST    Performed On:  1/22/2019 9:03 AM CST by Kaylah Murray CMA               Assessment   Have you ever felt you should cut down on your drinking :   Yes   Have people annoyed you by criticizing your drinking :   No   Have you ever felt bad or guilty about your drinking :   No   Have you ever taken a drink first thing in the morning to steady your nerves or get rid of a hangover (Eye-opener) :   No   CAGE Score :   1    Kaylah Murray CMA - 1/22/2019 9:03 AM CST

## 2022-02-16 NOTE — PROGRESS NOTES
Patient:   ELSY NOYOLA            MRN: [id number]            FIN: [id number]               Age:   69 years     Sex:  Male     :  1950   Associated Diagnoses:   Acute pansinusitis   Author:   Pan Armenta MD      Impression and Plan   Diagnosis     Acute pansinusitis (KWZ65-RF J01.40).     Course:  Worsening.    Orders     Orders   Charges (Evaluation and Management):  38680 office outpatient visit 15 minutes (Charge) (Order): Quantity: 1, Acute pansinusitis.     Orders (Selected)   Prescriptions  Prescribed  doxycycline hyclate 100 mg oral tablet: = 1 tab(s) ( 100 mg ), PO, bid, # 28 tab(s), 0 Refill(s), Type: Maintenance, Pharmacy: [facility], 1 tab(s) Oral bid,x14 day(s).        Visit Information      Date of Service: 2019 03:30 pm  Performing Location: [facility]  Encounter#: [id number]      Primary Care Provider (PCP):  Pan Armenta MD    NPI# [id number]   Visit type:  New symptom.    Accompanied by:  No one.    Source of history:  Self.    History limitation:  None.       Chief Complaint   Chief complaint discussed and confirmed correct.     2019 3:27 PM CDT     Pt here for med ck        History of Present Illness             The patient presents with sinus problem.  The sinus problem is located in the frontal sinus and ethmoid sinus.  The sinus problem is characterized by nasal congestion, headache and facial pain.  The severity of the sinus problem is severe.  The sinus problem is constant.  The context of the sinus problem: occurred in association with illness.  Associated symptoms consist of none.        Review of Systems   Constitutional:  Negative.    Eye:  Negative.    Ear/Nose/Mouth/Throat:  Nasal congestion, Sinus pain.    Respiratory:  Negative.    Cardiovascular:  Negative.    Gastrointestinal:  Negative.    Genitourinary:  Negative.    Hematology/Lymphatics:  Negative.    Endocrine:  Negative.    Immunologic:  Negative.    Musculoskeletal:   Negative.    Integumentary:  Negative.    Neurologic:  Negative.    Psychiatric:  Negative.    All other systems reviewed and negative      Health Status   Allergies:    Allergic Reactions (Selected)  No known allergies   Medications:  (Selected)   Prescriptions  Prescribed  doxycycline hyclate 100 mg oral tablet: = 1 tab(s) ( 100 mg ), PO, bid, # 28 tab(s), 0 Refill(s), Type: Maintenance, Pharmacy: [facility], 1 tab(s) Oral bid,x14 day(s)  hydrochlorothiazide-lisinopril 12.5 mg-20 mg oral tablet: 1 tab(s), Oral, daily, # 90 tab(s), 1 Refill(s), Type: Maintenance, Pharmacy: [facility]  Documented Medications  Documented  aspirin 81 mg oral delayed release tablet: = 1 tab(s) ( 81 mg ), Oral, daily, 0 Refill(s), Type: Maintenance   Problem list:    All Problems  Allergic rhinitis / SNOMED CT 044841585 / Confirmed  ED (erectile dysfunction) / SNOMED CT 487365671 / Confirmed  GERD (gastroesophageal reflux disease) / SNOMED CT 252889886 / Confirmed  HTN (hypertension) / SNOMED CT 8786788449 / Confirmed  Impingement syndrome of shoulder / SNOMED CT 608655187 / Confirmed  Obesity / SNOMED CT 7852408200 / Probable  Onychomycosis / SNOMED CT 5599849853 / Confirmed  Osteoarthritis, knee / SNOMED CT 205153149 / Confirmed  Photosensitivity / SNOMED CT 508295246 / Confirmed  Reactive airway disease / SNOMED CT 6532112991 / Confirmed      Histories   Past Medical History:    Active  Allergic rhinitis (034103619)  Impingement syndrome of shoulder (539405014)  HTN (hypertension) (8376536069)  ED (erectile dysfunction) (702321999)  Photosensitivity (671744347)  Obesity (0837420355)  Onychomycosis (0745602550)  Osteoarthritis, knee (160990811)   Family History:    Lymphosarcoma  Mother ()  Stroke  Father ()  Heart attack  Mother ()     Procedure history:    Colonoscopy (SNOMED CT 964548443) performed by Rene Talley MD on 2013 at 63 Years.  Comments:  2013 3:21 PM SHAYY Hines  Uziel GALARZA  Normal colonoscopy, repeat in 10 years  Toe reattachment in 1964 at 14 Years.  Comments:  10/1/2015 10:03 AM CDT - Wei Gradyi  2nd & 3rd, right foot   Social History:        Alcohol Assessment: Current            Current, Beer, Daily      Tobacco Assessment: Denies Tobacco Use      Exercise and Physical Activity Assessment: Regular exercise            Exercise type: Walking.        Physical Examination   Vital signs reviewed  and within acceptable limits    Vital Signs   8/8/2019 3:27 PM CDT Peripheral Pulse Rate 61 bpm    Systolic Blood Pressure 138 mmHg  HI    Diastolic Blood Pressure 62 mmHg    Mean Arterial Pressure 87 mmHg    Oxygen Saturation 98 %      Measurements from flowsheet : Measurements   8/8/2019 3:27 PM CDT Height Measured - Standard 71 in    Weight Measured - Standard 249 lb    BSA 2.38 m2    Body Mass Index 34.72 kg/m2  HI      General:  Alert and oriented, No acute distress.    Eye:  Pupils are equal, round and reactive to light, Extraocular movements are intact, Normal conjunctiva.    HENT:  Normocephalic, Tympanic membranes are clear, Normal hearing, Oral mucosa is moist, No pharyngeal erythema.         Sinus: Bilateral, Ethmoid sinus, Frontal sinus, Tenderness.    Neck:  Supple, Non-tender, No lymphadenopathy.    Respiratory:  Lungs are clear to auscultation, Respirations are non-labored, Breath sounds are equal.    Cardiovascular:  Normal rate, Regular rhythm, No murmur, Normal peripheral perfusion, No edema.    Integumentary:  Warm, Dry, Pink.    Psychiatric:  Cooperative, Appropriate mood & affect, Normal judgment.

## 2022-02-16 NOTE — LETTER
(Inserted Image. Unable to display)   July 23, 2019        ELSY NOYOLA   [place], [place] [id number]        Dear ELSY,      Thank you for selecting [facility] (previously [facility]) for your healthcare needs.    Our records indicate you are due for the following services:    Non-Fasting Labs    If you had your labs done at another facility or with Direct Access Lab Testing at [facility], please bring in a copy of the results to your next visit, mail a copy, or drop off a copy of your results to your Healthcare Provider.  Hypertension check ~ please remember to bring your at-home blood pressure readings with you to your appointment.     You are due for lab work and an office visit, please schedule the lab appointment 1 week before the office visit.  This will assure all results are available to discuss with your provider during your visit.    **It is very helpful if you bring your medication bottles to your appointment.  This assures we have all of your current medications, including strength and dosing information, documented accurately in your medical record.    To schedule an appointment or if you have further questions, please contact your primary clinic:   [facility]       [phone]   [facility]       [phone]              [facility]     [phone]      Powered by [employer]    Sincerely,    Pan Armenta M.D.

## 2022-02-16 NOTE — LETTER
(Inserted Image. Unable to display)   [address] [address]  August 12, 2019      ELSY NOYOLA   [place], [place] [id number]        Dear ELSY,     Thank you for selecting [facility] for your healthcare needs. Below you will find the results of the recent tests done at our clinic.        All results are within acceptable limits for a non-fasting sample. No treatment changes are recommended at this time.      Result Name Current Result Previous Result Reference Range   Sodium Level (mmol/L)  139 8/8/2019  137 1/22/2019 135 - 146   Potassium Level (mmol/L)  4.0 8/8/2019  4.2 1/22/2019 3.5 - 5.3   Chloride Level (mmol/L)  105 8/8/2019  103 1/22/2019 98 - 110   CO2 Level (mmol/L)  28 8/8/2019  27 1/22/2019 20 - 32   Glucose Level (mg/dL) ((H)) 125 8/8/2019 ((H)) 108 1/22/2019 65 - 99   BUN (mg/dL) ((H)) 33 8/8/2019  17 1/22/2019 7 - 25   Creatinine Level (mg/dL)  1.16 8/8/2019  0.75 1/22/2019 0.70 - 1.25   BUN/Creat Ratio ((H)) 28 8/8/2019  NOT APPLICABLE 1/22/2019 6 - 22   eGFR (mL/min/1.73m2)  65 8/8/2019  96 1/22/2019 > OR = 60 -    eGFR  (mL/min/1.73m2)  75 8/8/2019  111 1/22/2019 > OR = 60 -    Calcium Level (mg/dL)  9.5 8/8/2019  9.2 1/22/2019 8.6 - 10.3   Bilirubin Total (mg/dL)  0.4 8/8/2019  0.8 1/22/2019 0.2 - 1.2   Alkaline Phosphatase (unit/L)  73 8/8/2019  63 1/22/2019 40 - 115   AST/SGOT (unit/L)  11 8/8/2019  23 1/22/2019 10 - 35   ALT/SGPT (unit/L)  15 8/8/2019  41 1/22/2019 9 - 46   Protein Total (gm/dL)  7.0 8/8/2019  6.7 1/22/2019 6.1 - 8.1   Albumin Level (gm/dL)  4.0 8/8/2019  4.2 1/22/2019 3.6 - 5.1   Globulin  3.0 8/8/2019  2.5 1/22/2019 1.9 - 3.7   A/G Ratio  1.3 8/8/2019  1.7 1/22/2019 1.0 - 2.5   WBC  7.8 8/8/2019  5.9 1/22/2019 3.8 - 10.8   RBC ((L)) 4.11 8/8/2019  4.86 1/22/2019 4.20 - 5.80   Hgb (gm/dL) ((L)) 12.1 8/8/2019  14.2 1/22/2019 13.2 - 17.1   Hct (%) ((L)) 35.6 8/8/2019  41.5 1/22/2019 38.5 - 50.0   MCV  (fL)  86.6 8/8/2019  85.4 1/22/2019 80.0 - 100.0   MCH (pg)  29.4 8/8/2019  29.2 1/22/2019 27.0 - 33.0   MCHC (gm/dL)  34.0 8/8/2019  34.2 1/22/2019 32.0 - 36.0   RDW (%)  13.3 8/8/2019  13.2 1/22/2019 11.0 - 15.0   Platelet  324 8/8/2019  277 1/22/2019 140 - 400   MPV (fL)  10.0 8/8/2019  10.7 1/22/2019 7.5 - 12.5       Please contact my practice at [phone]  if you have any questions or concerns.     Sincerely,        Pan Armenta MD          What do your labs mean?  Below is a glossary of commonly ordered labs:  LDL   Bad Cholesterol   HDL   Good Cholesterol  AST/ALT   Liver Function   Cr/Creatinine   Kidney Function  Microalbumin   Kidney Function  BUN   Kidney Function  PSA   Prostate    TSH   Thyroid Hormone  HgbA1c   Diabetes Test   Hgb (Hemoglobin)   Red Blood Cells

## 2022-02-16 NOTE — TELEPHONE ENCOUNTER
Entered by Kaylah Murray CMA on January 21, 2020 8:35:47 AM CST  ---------------------  From: Kaylah Murray CMA   To: [place] [address]    Sent: 1/21/2020 8:35:47 AM CST  Subject: Medication Management     ** Submitted: **  Order:hydrochlorothiazide-lisinopril (hydrochlorothiazide-lisinopril 12.5 mg-20 mg oral tablet)  1 tab(s)  Oral  daily  Qty:  90 EA        Days Supply:  90        Refills:  0          Substitutions Allowed     Route To [place] [address]    Signed by Kaylah Murray CMA  1/21/2020 8:35:00 AM    ** Submitted: **  Complete:hydrochlorothiazide-lisinopril (hydrochlorothiazide-lisinopril 12.5 mg-20 mg oral tablet)   Signed by Kaylah Murray CMA  1/21/2020 8:35:00 AM    ** Not Approved:  **  hydrochlorothiazide-lisinopril (Lisinopril-hydroCHLOROthiazide 20-12.5 MG Oral Tablet)  TAKE 1 TABLET BY MOUTH ONCE DAILY  Qty:  90 EA        Days Supply:  90        Refills:  0          Substitutions Allowed     Route To [place] [address]   Signed by Kaylah Murray CMA            ------------------------------------------  From: [place] [address]  To: Pan Armenta MD  Sent: January 21, 2020 8:22:50 AM CST  Subject: Medication Management  Due: January 22, 2020 8:22:50 AM CST    ** On Hold Pending Signature **  Drug: hydrochlorothiazide-lisinopril (hydrochlorothiazide-lisinopril 12.5 mg-20 mg oral tablet)  TAKE 1 TABLET BY MOUTH ONCE DAILY  Quantity: 90 EA  Days Supply: 90  Refills: 0  Substitutions Allowed  Notes from Pharmacy:     Dispensed Drug: hydrochlorothiazide-lisinopril (hydrochlorothiazide-lisinopril 12.5 mg-20 mg oral tablet)  TAKE 1 TABLET BY MOUTH ONCE DAILY  Quantity: 90 EA  Days Supply: 90  Refills: 0  Substitutions Allowed  Notes from Pharmacy:   ------------------------------------------

## 2022-02-16 NOTE — NURSING NOTE
Comprehensive Intake Entered On:  8/8/2019 3:28 PM CDT    Performed On:  8/8/2019 3:27 PM CDT by Kaylah Murray CMA               Summary   Weight Measured :   249 lb(Converted to: 249 lb 0 oz, 112.94 kg)    Body Mass Index :   34.72 kg/m2 (HI)    Body Surface Area :   2.38 m2   Systolic Blood Pressure :   138 mmHg (HI)    Diastolic Blood Pressure :   62 mmHg   Mean Arterial Pressure :   87 mmHg   Peripheral Pulse Rate :   61 bpm   Oxygen Saturation :   98 %   Kaylah Murray CMA - 8/8/2019 3:30 PM CDT   Chief Complaint :   Pt here for med ck   Height Measured :   71 in(Converted to: 5 ft 11 in, 180.34 cm)    Kaylah Murray CMA - 8/8/2019 3:27 PM CDT   Health Status   Allergies Verified? :   Yes   Medication History Verified? :   Yes   Medical History Verified? :   Yes   Pre-Visit Planning Status :   Completed   Tobacco Use? :   Former smoker   Kaylah Murray CMA - 8/8/2019 3:27 PM CDT   Consents   Consent for Immunization Exchange :   Consent Granted   Consent for Immunizations to Providers :   Consent Granted   Kaylah Murray CMA - 8/8/2019 3:27 PM CDT   Meds / Allergies   (As Of: 8/8/2019 3:34:20 PM CDT)   Allergies (Active)   No known allergies  Estimated Onset Date:   Unspecified ; Created By:   Emani Becerra CMA; Reaction Status:   Active ; Category:   Drug ; Substance:   No known allergies ; Type:   Allergy ; Updated By:   Emani Becerra CMA; Reviewed Date:   8/8/2019 3:34 PM CDT        Medication List   (As Of: 8/8/2019 3:34:20 PM CDT)   Prescription/Discharge Order    hydrochlorothiazide-lisinopril  :   hydrochlorothiazide-lisinopril ; Status:   Prescribed ; Ordered As Mnemonic:   hydrochlorothiazide-lisinopril 12.5 mg-20 mg oral tablet ; Simple Display Line:   1 tab(s), Oral, daily, 90 tab(s), 1 Refill(s) ; Ordering Provider:   Pan Armenta MD; Catalog Code:   hydrochlorothiazide-lisinopril ; Order Dt/Tm:   7/29/2019 8:03:31 AM            Home Meds    aspirin  :   aspirin ; Status:   Documented ;  Ordered As Mnemonic:   aspirin 81 mg oral delayed release tablet ; Simple Display Line:   81 mg, 1 tab(s), Oral, daily, 0 Refill(s) ; Catalog Code:   aspirin ; Order Dt/Tm:   8/8/2019 3:34:04 PM

## 2022-02-16 NOTE — PROGRESS NOTES
Chief Complaint    AWV-medicare, right groin pain.  History of Present Illness      General health status:  good      Diet:  calorie restriction      Exercise:  no regular activity, recently active making maple syrup      Medical encounters:  none      Dental exam:  recently completed      Eye exam:  recently completed      Caffeine use:  daily      Tobacco use:  no      Alcohol use:  weekly      Lipid and diabetes screening:  due      Colon cancer screening:  due in 2023      Prostate cancer screening:  up to date      Other concerns:  RLQ pain, right testicular pain      Chronic disease:  HTN and GERD are well controlled         Review of Systems          Constitutional:  No fever, No chills, No sweats, No weakness, No fatigue.            Eye:  No recent visual problem.            Ear/Nose/Mouth/Throat:  No decreased hearing, No nasal congestion, No sore throat.            Respiratory:  No shortness of breath, No cough.            Cardiovascular:  Peripheral edema, No chest pain, No palpitations.            Gastrointestinal:  No nausea, No vomiting, No diarrhea, No constipation, No heartburn.            Genitourinary:  No dysuria, No change in urine stream.            Hematology/Lymphatics:  No bruising tendency, No bleeding tendency.            Endocrine:  No cold intolerance, No heat intolerance.            Immunologic:  Negative.            Musculoskeletal:  No back pain, No neck pain, No joint pain, No muscle pain.            Integumentary:  Dryness, Skin lesion, No rash.            Neurologic:  Alert and oriented X4, No headache.          Cognitive impairment:          Year:  ok          Date:  ok          City: ok           Psychiatric:  No anxiety, No depression.                      PHQ9 and CAGE reviewed and discussed with patient.                      Hearing:  decreased          ADL: no concerns, completely independent          Fall risk: none          Home safety:  no concerns                     Advanced care planning:  completed   Physical Exam   Vitals & Measurements    HR: 76 (Peripheral)  BP: 142/78     HT: 71 in  WT: 244 lb  BMI: 34.03           General:  Alert and oriented, No acute distress.            Eye:  Normal conjunctiva, Vision unchanged.            HENT:  Normocephalic, Tympanic membranes are clear, Oral mucosa is moist, No pharyngeal erythema.            Neck:  Supple, Non-tender, No carotid bruit, No lymphadenopathy, No thyromegaly.            Respiratory:  Lungs are clear to auscultation, Respirations are non-labored.            Cardiovascular:  Normal rate, Regular rhythm, Normal peripheral perfusion.            Gastrointestinal:  Soft, Non-tender.            Genitourinary:  No costovertebral angle tenderness.  Right epididymal tenderness          Musculoskeletal:  Normal range of motion, Normal strength.            Integumentary:  Warm, Dry, No rash.            Neurologic:  Alert, Oriented.            Psychiatric:  Cooperative, Appropriate mood & affect.    Assessment/Plan       1. Medicare annual wellness visit, subsequent (Z00.00)         Discussed diet, weight management, BMI, activity and exercise        Follow up in one year        Risk factors for development of chronic disease and infirmities of ageing have been discussed and plans for minimizing and screening for these conditions have been discussed.  Plans in place for management of conditions identified.  The preventive services checklist has been reviewed with the patient and a copy has been placed in the electronic record.  Prostate cancer, colon cancer, diabetes, lipid, HTN, obesity screening discussed and initiated                2. HTN (hypertension) (I10)         well controlled, continue current medication                  Ordered:          Basic Metabolic Panel* ([facility]), Specimen Type: Serum, Collection Date: 04/05/21 12:16:00 CDT                3. GERD (gastroesophageal reflux disease) (K21.9)         controlled,  continue current medication                         4. Rt groin pain (R10.31)         Mild epididymitis currently resolving        NSAIDS as needed, follow up if not improving                5. RUQ pain (R10.11)         suspect mild indigestion from poor food choices over the last several weeks                6. Lipid screening (Z13.220)         Ordered:          Lipid panel with reflex to direct ldl* ([facility]), Specimen Type: Serum, Collection Date: 04/05/21 12:16:00 CDT                7. Decreased hearing of both ears (H91.93)         audiology referral;         Ordered:          Referral (Request), 04/05/21 13:38:00 CDT, Referred to: Audiology, Decreased hearing of both ears                Orders:         Return to Clinic (Request), RFV: Yearly exam/physical, Return in 1 year  Patient Information     Name:ELSY NOYOLA      Address:      [address] [id number]     Sex:Male     YOB: 1950     [phone]     Emergency Contact:NILE NOYOLA     MRN:[id number]     FIN:[id number]     Location:[facility]     Date of Service:04/05/2021      Primary Care Physician:       NONE ,       Attending Physician:       Rene Kennedy MD, [phone]  Problem List/Past Medical History    Ongoing     Allergic rhinitis     ED (erectile dysfunction)     GERD (gastroesophageal reflux disease)     HTN (hypertension)     Impingement syndrome of shoulder     Obesity     Onychomycosis     Osteoarthritis, knee     Photosensitivity     Reactive airway disease    Historical     No qualifying data  Procedure/Surgical History     Colonoscopy (05/09/2013)            Comments: Normal colonoscopy, repeat in 10 years.     Toe reattachment (1964)            Comments: 2nd & 3rd, right foot.  Medications    aspirin 81 mg oral delayed release tablet, 81 mg= 1 tab(s), Oral, daily    hydrochlorothiazide-lisinopril 12.5 mg-20 mg oral tablet, 1 tab(s), Oral, daily, 1 refills    omeprazole  20 mg oral delayed release capsule, 20 mg= 1 cap(s), Oral, daily  Allergies    No known allergies  Social History    Smoking Status     Former smoker     Alcohol - Current      Current, Beer, Daily     Electronic Cigarette/Vaping      Electronic Cigarette Use: Never.     Exercise - Regular exercise      Exercise type: Walking.     Tobacco - Denies Tobacco Use      Former smoker, quit more than 30 days ago  Family History    Heart attack: Mother.    Lymphosarcoma: Mother.    Stroke: Father.    Brother: History is negative  Immunizations      Vaccine Date Status          SARS-CoV-2 (COVID-19) Moderna-1273 03/31/2021 Recorded          SARS-CoV-2 (COVID-19) Moderna-1273 03/03/2021 Recorded          influenza virus vaccine, inactivated 10/20/2020 Recorded          pneumococcal (PPSV23) 10/21/2019 Given          influenza virus vaccine, inactivated 10/21/2019 Given          influenza virus vaccine, inactivated 10/09/2018 Recorded          influenza virus vaccine, inactivated 10/10/2017 Recorded          pneumococcal (PCV13) 10/04/2017 Given          influenza 10/11/2016 Recorded          influenza 10/13/2015 Recorded          influenza virus vaccine, inactivated 10/08/2014 Recorded          influenza virus vaccine, inactivated 10/18/2012 Recorded          influenza virus vaccine, inactivated 10/11/2011 Given          influenza virus vaccine, inactivated 10/29/2010 Given          influenza virus vaccine, inactivated 10/26/2010 Recorded          influenza, H1N1, inactivated 12/18/2009 Recorded          tetanus/diphth/pertuss (Tdap) adult/adol 06/04/2009 Recorded          tetanus/diphth/pertuss (Tdap) adult/adol 06/01/2009 Recorded

## 2022-02-16 NOTE — NURSING NOTE
Comprehensive Intake Entered On:  4/21/2020 8:09 AM CDT    Performed On:  4/21/2020 8:08 AM CDT by Kaylah Murray CMA               Summary   Chief Complaint :   verbal consent given for telephone visit to discuss meds and get refills   Kaylah Murray CMA - 4/21/2020 8:08 AM CDT   Health Status   Allergies Verified? :   Yes   Medication History Verified? :   Yes   Medical History Verified? :   Yes   Tobacco Use? :   Former smoker   Kaylah Murray CMA - 4/21/2020 8:08 AM CDT   Consents   Consent for Immunization Exchange :   Consent Granted   Consent for Immunizations to Providers :   Consent Granted   Kaylah Murray CMA - 4/21/2020 8:08 AM CDT   Meds / Allergies   (As Of: 4/21/2020 8:09:17 AM CDT)   Allergies (Active)   No known allergies  Estimated Onset Date:   Unspecified ; Created By:   Emani Becerra; Reaction Status:   Active ; Category:   Drug ; Substance:   No known allergies ; Type:   Allergy ; Updated By:   Emani Becerra; Reviewed Date:   4/21/2020 8:09 AM CDT        Medication List   (As Of: 4/21/2020 8:09:17 AM CDT)   Prescription/Discharge Order    azithromycin  :   azithromycin ; Status:   Processing ; Ordered As Mnemonic:   azithromycin 500 mg oral tablet ; Ordering Provider:   Pan Armenta MD; Action Display:   Complete ; Catalog Code:   azithromycin ; Order Dt/Tm:   4/21/2020 8:09:11 AM CDT          hydrochlorothiazide-lisinopril  :   hydrochlorothiazide-lisinopril ; Status:   Prescribed ; Ordered As Mnemonic:   hydrochlorothiazide-lisinopril 12.5 mg-20 mg oral tablet ; Simple Display Line:   1 tab(s), Oral, daily, 90 EA, 0 Refill(s) ; Ordering Provider:   Pan Armenta MD; Catalog Code:   hydrochlorothiazide-lisinopril ; Order Dt/Tm:   1/21/2020 8:35:30 AM CST            Home Meds    aspirin  :   aspirin ; Status:   Documented ; Ordered As Mnemonic:   aspirin 81 mg oral delayed release tablet ; Simple Display Line:   81 mg, 1 tab(s), Oral, daily, 0 Refill(s) ; Catalog Code:   aspirin ;  Order Dt/Tm:   8/8/2019 3:34:04 PM CDT

## 2022-02-16 NOTE — TELEPHONE ENCOUNTER
Entered by Shira Henderson CMA on April 27, 2021 1:07:31 PM CDT  ---------------------  From: Shira Henderson CMA   To: [place] [address]    Sent: 4/27/2021 1:07:31 PM CDT  Subject: Medication Management     ** Submitted: **  Order:hydrochlorothiazide-lisinopril (hydrochlorothiazide-lisinopril 12.5 mg-20 mg oral tablet)  1 tab(s)  Oral  daily  Qty:  90 tab(s)        Days Supply:  90        Refills:  3          Substitutions Allowed     Route To [place] [address]    Signed by Shira Henderson CMA  4/27/2021 6:06:00 PM [facility]    ** Submitted: **  Complete:hydrochlorothiazide-lisinopril (hydrochlorothiazide-lisinopril 12.5 mg-20 mg oral tablet)   Signed by Shira Henderson CMA  4/27/2021 6:07:00 PM [facility]    ** Not Approved:  **  hydrochlorothiazide-lisinopril (Lisinopril-hydroCHLOROthiazide 20-12.5 MG Oral Tablet)  Take 1 tablet by mouth once daily  Qty:  90 tab(s)        Days Supply:  90        Refills:  0          Substitutions Allowed     Route To [place] [address]   Signed by Shira Henderson CMA            ------------------------------------------  From: [place] [address]  To: Pan Armenta MD  Sent: April 26, 2021 8:18:12 AM CDT  Subject: Medication Management  Due: April 27, 2021 12:41:43 AM CDT     ** On Hold Pending Signature **     Dispensed Drug: hydrochlorothiazide-lisinopril (hydrochlorothiazide-lisinopril 12.5 mg-20 mg oral tablet), Take 1 tablet by mouth once daily  Quantity: 90 tab(s)  Days Supply: 90  Refills: 0  Substitutions Allowed  Notes from Pharmacy:  ------------------------------------------4/5/21 AWV  rtc 1 year

## 2022-02-16 NOTE — PROGRESS NOTES
Patient:   ELSY NOYOLA            MRN: [id number]            FIN: [id number]               Age:   69 years     Sex:  Male     :  1950   Associated Diagnoses:   Changing skin lesion   Author:   Pan Armenta MD      Impression and Plan   Diagnosis     Changing skin lesion (IKJ59-SJ L98.9).     Orders     Orders (Selected)   Outpatient Orders  Ordered (In Transit)  Tissue pathology* ([facility]): Specimen Type: Miscellaneous Specimen, Collection Date: 19 16:42:00 CDT  Completed   excision tumor soft tiss face/scalp subq less than 2cm (Charge): Quantity: 1, Changing skin lesion.        Procedure   Dermatology Surgical Procedure   Date/ Time:  2019 4:00:00 PM.     Confirmed: patient, procedure, side, and site are correct, safety procedures followed.     Informed consent: signed by patient.     Performed by: JAYCE Vazquez.     Indication: remove lesion.     Procedure performed: Surgical Excision 1.9 cm x 1.0 cm changing pigmented skin lesion on left shin  Site and number of lesions: Left shin anterior  Skin preparation: Isopropyl Alcohol  Anesthesia: Xylocaine 1% 1 ml  Procedure: Eliptical incision with #15 scalpel and disection with Iris scissors  Hemostasis: Electrocautery  Pathology: Preservative  Closure: 5 interrupted 3-0 Ethilon sutures  Dressing: Adhesive strip and Bacitracin Ointment     .     Complications: none.     Procedure tolerated: well.     suture removal 13 days.     wound care instructions given.

## 2022-02-16 NOTE — LETTER
(Inserted Image. Unable to display)         April 20, 2020      ELSY NOYOLA   [place], [place] [id number]        Dear ELSY,    You are due for medication check with Dr Armenta.  Telephone visits are available at this time.   Dr Armenta will refill your medication at that appointment.      Make sure that you schedule your appointment before you run out of medication to ensure you're not going without any medication.    You may call  [phone] to schedule your telephone visit with Dr Geovany Hawkins, [facility] with Dr Armenta

## 2022-02-16 NOTE — PROGRESS NOTES
Patient:   ELSY NOYOLA            MRN: [id number]            FIN: [id number]               Age:   67 years     Sex:  Male     :  1950   Associated Diagnoses:   Dizziness; HTN (hypertension); Screening for diabetes mellitus; Screening for lipid disorders; Acute sinusitis   Author:   Lili Han      Visit Information      Date of Service: 2017 01:48 pm  Performing Location: [facility]  Encounter#: [id number]      Primary Care Provider (PCP):  Pan Armenta MD    NPI# [id number]      Referring Provider:  No referring provider recorded for selected visit.      Chief Complaint   2017 1:49 PM CDT    Pt here for elevated BP        History of Present Illness   Confirmed symptoms and concerns with patient as presented in CC above.  He is worried about his heart. three weeks ago he was getting on a plane returning from a week in [place] and he started feeling dizzy. No CP, no SOB, no tingling or numbness in arms or face .  He has continued to have an off-balanced feeling as well as pain in his teeth, frontal HA, swollen glands and scratchy throat.  He has had seasonal allergies in past but not for years.  he has been taking his bp meds for about 2 years, also baby asa daily  wife has been checking his bp at home and systolic number running 140sand 150's  Has noticed some swelling in ankles just lately. He lost 20# then gained 10# back, quit eating fast food and reduced beer intake      Health Status   Allergies:    Allergic Reactions (Selected)  No known allergies   Medications:  (Selected)   Prescriptions  Prescribed  Augmentin 500 mg-125 mg oral tablet: 1 tab(s), PO, q8hr, # 21 tab(s), 0 Refill(s), Type: Maintenance, Pharmacy: [facility], 1 tab(s) po q8 hrs,x7 day(s)  Viagra 100 mg oral tablet: 1 tab(s) ( 100 mg ), PO, Daily, # 20 tab(s), 0 Refill(s), Type: Maintenance  hydroCHLOROthiazide 12.5 mg oral capsule: 1 cap(s) ( 12.5 mg ), po, daily, Instructions: each  morning, # 90 tab(s), 0 Refill(s), Type: Maintenance, Pharmacy: [facility], 1 cap(s) po daily,Instr:each morning  losartan 100 mg oral tablet: 1 tab(s) ( 100 mg ), po, daily, # 90 tab(s), 3 Refill(s), Type: Maintenance, Pharmacy: [id number], 1 tab(s) po daily  Documented Medications  Documented  aspirin 81 mg oral enteric coated tablet: 1 tab(s) ( 81 mg ), PO, Daily, 0   Problem list:    All Problems  Allergic rhinitis / SNOMED CT 154029883 / Confirmed  HTN (hypertension) / SNOMED CT 1376676393 / Confirmed  Photosensitivity / SNOMED CT 937822470 / Confirmed  Onychomycosis / SNOMED CT 9402036424 / Confirmed  Obesity / SNOMED CT 0172352435 / Probable  Reactive airway disease / SNOMED CT 5864631331 / Confirmed  ED (erectile dysfunction) / SNOMED CT 941714558 / Confirmed  Osteoarthritis, knee / SNOMED CT 693204957 / Confirmed  Impingement syndrome of shoulder / SNOMED CT 057317019 / Confirmed      Histories   Past Medical History:    Active  Allergic rhinitis (553226650)  Impingement syndrome of shoulder (262340548)  HTN (hypertension) (1093704780)  ED (erectile dysfunction) (305895988)  Photosensitivity (195720360)  Obesity (3298551672)  Onychomycosis (4686579518)  Osteoarthritis, knee (050777102)   Family History:    Lymphosarcoma  Mother ()  Stroke  Father ()  Heart attack  Mother ()     Procedure history:    Colonoscopy (SNOMED CT 248364553) performed by Rene Talley MD on 2013 at 63 Years.  Comments:  2013 3:21 PM - Uziel Hines MA  Normal colonoscopy, repeat in 10 years  Toe reattachment in 1964 at 14 Years.  Comments:  10/1/2015 10:03 AM - Nelia Grady  2nd & 3rd, right foot   Social History:        Alcohol Assessment: Current            Current, Beer, Daily      Tobacco Assessment: Denies Tobacco Use      Exercise and Physical Activity Assessment: Regular exercise            Exercise type: Walking.        Physical Examination   Vital Signs   2017  1:49 PM CDT Peripheral Pulse Rate 68 bpm    Pulse Site Radial artery    HR Method Manual    Systolic Blood Pressure 144 mmHg  HI    Diastolic Blood Pressure 76 mmHg    Mean Arterial Pressure 99 mmHg    BP Site Right arm    BP Method Manual      Measurements from flowsheet : Measurements   5/18/2017 1:49 PM CDT Height Measured - Standard 71 in    Weight Measured - Standard 242.4 lb    BSA 2.34 m2    Body Mass Index 33.8 kg/m2      General:  Alert and oriented, No acute distress.    Eye:  Normal conjunctiva.    HENT:  Tympanic membranes are clear, Normal hearing, Oral mucosa is moist, No pharyngeal erythema, some tenderness over max sinuses with palpation.    Neck:  Supple, Non-tender, No lymphadenopathy.    Respiratory:  Lungs are clear to auscultation, Respirations are non-labored, Breath sounds are equal, Symmetrical chest wall expansion.    Cardiovascular:  Normal rate, Regular rhythm, No murmur, he has 1plus edema in ankles bilat pitting.    Musculoskeletal:  Normal range of motion, Normal gait.    Integumentary:  Warm, Dry, Pink, No rash.    Neurologic:  Alert, Oriented.    Psychiatric:  Cooperative.       Review / Management   ECG interpretation:  Time  5/18/2017 2:29:00 PM, Within normal limits.       Impression and Plan   Diagnosis     Dizziness (ODG27-EM R42).     HTN (hypertension) (CMZ78-EJ I10).     Screening for diabetes mellitus (WEJ15-AA Z13.1).     Screening for lipid disorders (REF01-LM Z13.220).     Acute sinusitis (RDQ36-PQ J01.90).     Plan:  symptoms seem related to sinus rather than cardiac  EKG reassuring  WIll add diuretic and work on wt loss and recheck in august or sept.    Patient Instructions:       Counseled: Patient, Regarding diagnosis, Regarding treatment, Regarding medications, Verbalized understanding, Counseled on symptomatic management. Return to clinic for re evaluation if worsening, simply not improving, or failure to resolve.   .    Orders     Orders (Selected)   Outpatient  Orders  Completed  Basic Metabolic Panel (Request): HTN (hypertension)  Hemoglobin A1c (Request): Screening for diabetes mellitus  Lipid Panel and Chol/HDL Ratio w/ Reflex to Direct LDL (Request): Screening for lipid disorders  Prescriptions  Prescribed  Augmentin 500 mg-125 mg oral tablet: 1 tab(s), PO, q8hr, # 21 tab(s), 0 Refill(s), Type: Maintenance, Pharmacy: [facility], 1 tab(s) po q8 hrs,x7 day(s)  hydroCHLOROthiazide 12.5 mg oral capsule: 1 cap(s) ( 12.5 mg ), po, daily, Instructions: each morning, # 90 tab(s), 0 Refill(s), Type: Maintenance, Pharmacy: [facility], 1 cap(s) po daily,Instr:each morning.

## 2022-04-20 ENCOUNTER — OFFICE VISIT (OUTPATIENT)
Dept: FAMILY MEDICINE | Facility: CLINIC | Age: 72
End: 2022-04-20
Payer: COMMERCIAL

## 2022-04-20 VITALS
HEIGHT: 71 IN | WEIGHT: 246.8 LBS | HEART RATE: 72 BPM | BODY MASS INDEX: 34.55 KG/M2 | DIASTOLIC BLOOD PRESSURE: 80 MMHG | TEMPERATURE: 97 F | SYSTOLIC BLOOD PRESSURE: 123 MMHG

## 2022-04-20 DIAGNOSIS — L30.9 ECZEMA, UNSPECIFIED TYPE: ICD-10-CM

## 2022-04-20 DIAGNOSIS — Z13.6 SCREENING FOR CARDIOVASCULAR CONDITION: ICD-10-CM

## 2022-04-20 DIAGNOSIS — L82.1 SEBORRHEIC KERATOSIS: ICD-10-CM

## 2022-04-20 DIAGNOSIS — H90.3 BILATERAL SENSORINEURAL HEARING LOSS: ICD-10-CM

## 2022-04-20 DIAGNOSIS — I10 PRIMARY HYPERTENSION: ICD-10-CM

## 2022-04-20 DIAGNOSIS — Z00.00 ENCOUNTER FOR MEDICARE ANNUAL WELLNESS EXAM: Primary | ICD-10-CM

## 2022-04-20 DIAGNOSIS — K21.9 GASTROESOPHAGEAL REFLUX DISEASE, UNSPECIFIED WHETHER ESOPHAGITIS PRESENT: ICD-10-CM

## 2022-04-20 PROBLEM — M17.9 OSTEOARTHRITIS OF KNEE: Status: ACTIVE | Noted: 2022-04-20

## 2022-04-20 PROBLEM — M75.40 IMPINGEMENT SYNDROME OF SHOULDER REGION: Status: ACTIVE | Noted: 2022-04-20

## 2022-04-20 PROBLEM — B35.1 ONYCHOMYCOSIS: Status: ACTIVE | Noted: 2022-04-20

## 2022-04-20 PROBLEM — N52.9 ED (ERECTILE DYSFUNCTION): Status: ACTIVE | Noted: 2022-04-20

## 2022-04-20 PROBLEM — J30.9 ALLERGIC RHINITIS: Status: ACTIVE | Noted: 2022-04-20

## 2022-04-20 PROBLEM — J45.909 REACTIVE AIRWAY DISEASE: Status: ACTIVE | Noted: 2022-04-20

## 2022-04-20 PROBLEM — L56.8: Status: ACTIVE | Noted: 2022-04-20

## 2022-04-20 PROBLEM — E66.9 OBESITY: Status: ACTIVE | Noted: 2022-04-20

## 2022-04-20 LAB
ANION GAP SERPL CALCULATED.3IONS-SCNC: 6 MMOL/L (ref 3–14)
BUN SERPL-MCNC: 21 MG/DL (ref 7–30)
CALCIUM SERPL-MCNC: 9.6 MG/DL (ref 8.5–10.1)
CHLORIDE BLD-SCNC: 103 MMOL/L (ref 94–109)
CHOLEST SERPL-MCNC: 208 MG/DL
CO2 SERPL-SCNC: 27 MMOL/L (ref 20–32)
CREAT SERPL-MCNC: 0.84 MG/DL (ref 0.66–1.25)
FASTING STATUS PATIENT QL REPORTED: YES
GFR SERPL CREATININE-BSD FRML MDRD: >90 ML/MIN/1.73M2
GLUCOSE BLD-MCNC: 102 MG/DL (ref 70–99)
HDLC SERPL-MCNC: 39 MG/DL
LDLC SERPL CALC-MCNC: 148 MG/DL
NONHDLC SERPL-MCNC: 169 MG/DL
POTASSIUM BLD-SCNC: 4.3 MMOL/L (ref 3.4–5.3)
SODIUM SERPL-SCNC: 136 MMOL/L (ref 133–144)
TRIGL SERPL-MCNC: 107 MG/DL

## 2022-04-20 PROCEDURE — 99397 PER PM REEVAL EST PAT 65+ YR: CPT | Performed by: FAMILY MEDICINE

## 2022-04-20 PROCEDURE — 80048 BASIC METABOLIC PNL TOTAL CA: CPT | Performed by: FAMILY MEDICINE

## 2022-04-20 PROCEDURE — 80061 LIPID PANEL: CPT | Performed by: FAMILY MEDICINE

## 2022-04-20 PROCEDURE — 36415 COLL VENOUS BLD VENIPUNCTURE: CPT | Performed by: FAMILY MEDICINE

## 2022-04-20 PROCEDURE — 99214 OFFICE O/P EST MOD 30 MIN: CPT | Mod: 25 | Performed by: FAMILY MEDICINE

## 2022-04-20 RX ORDER — LISINOPRIL AND HYDROCHLOROTHIAZIDE 12.5; 2 MG/1; MG/1
1 TABLET ORAL DAILY
Qty: 90 TABLET | Refills: 3 | Status: SHIPPED | OUTPATIENT
Start: 2022-04-20 | End: 2023-04-19

## 2022-04-20 RX ORDER — LISINOPRIL AND HYDROCHLOROTHIAZIDE 12.5; 2 MG/1; MG/1
1 TABLET ORAL DAILY
COMMUNITY
Start: 2021-04-27 | End: 2022-04-20

## 2022-04-20 RX ORDER — TRIAMCINOLONE ACETONIDE 1 MG/G
CREAM TOPICAL 2 TIMES DAILY
Qty: 30 G | Refills: 1 | Status: SHIPPED | OUTPATIENT
Start: 2022-04-20

## 2022-04-20 ASSESSMENT — GERIATRIC DEPRESSION SCALE (GDS)
DO YOU PREFER TO STAY AT HOME, RATHER THAN GOING OUT AND DOING NEW THINGS: YES
HAVE YOU DROPPED MANY OF YOUR ACTIVITIES AND INTERESTS?: NO
ARE YOU AFRAID THAT SOMETHING BAD IS GOING TO HAPPEN TO YOU: NO
ARE YOU BASICALLY SATISFIED WITH YOUR LIFE: YES
DO YOU THINK IT IS WONDERFUL TO BE ALIVE NOW: YES
DO YOU FEEL FULL OF ENERGY: NO
ARE YOU IN GOOD SPIRITS MOST OF THE TIME: YES
DO YOU OFTEN FEEL HELPLESS: NO
DO YOU FEEL THAT YOUR SITUATION IS HOPELESS: NO
DO YOU FEEL PRETTY WORTHLESS THE WAY YOU ARE NOW: NO
DO YOU FEEL YOU HAVE MORE PROBLEMS WITH MEMORY THAN MOST: YES
DO YOU FEEL THAT YOUR LIFE IS EMPTY: NO
DO YOU THINK THAT MOST PEOPLE ARE BETTER OFF THAN YOU ARE: NO
DO YOU OFTEN GET BORED: NO
DO YOU FEEL HAPPY MOST OF THE TIME: YES

## 2022-04-20 ASSESSMENT — GERIATRIC DEPRESSION SCALE SHORT VERSION (GDS-SV): GDS TOTAL SCORE: 3

## 2022-04-20 ASSESSMENT — ACTIVITIES OF DAILY LIVING (ADL): CURRENT_FUNCTION: NO ASSISTANCE NEEDED

## 2022-04-20 NOTE — PROGRESS NOTES
"SUBJECTIVE:   Daniel Hernandez is a 72 year old male who presents for Preventive Visit.        Are you in the first 12 months of your Medicare coverage?  No    Healthy Habits:     In general, how would you rate your overall health?  Good    Frequency of exercise:  4-5 days/week    Duration of exercise:  Greater than 60 minutes    Do you usually eat at least 4 servings of fruit and vegetables a day, include whole grains    & fiber and avoid regularly eating high fat or \"junk\" foods?  Yes    Taking medications regularly:  Yes    Ability to successfully perform activities of daily living:  No assistance needed    Home Safety:  No safety concerns identified    Hearing Impairment:  Difficulty following a conversation in a noisy restaurant or crowded room, feel that people are mumbling or not speaking clearly, need to ask people to speak up or repeat themselves, find that men's voices are easier to understand than woman's and difficulty understanding soft or whispered speech    In the past 6 months, have you been bothered by leaking of urine? Yes    In general, how would you rate your overall mental or emotional health?  Good      PHQ-2 Total Score: 0    Additional concerns today:  Yes    Do you feel safe in your environment? Yes    Have you ever done Advance Care Planning? (For example, a Health Directive, POLST, or a discussion with a medical provider or your loved ones about your wishes): Yes, patient states has an Advance Care Planning document and will bring a copy to the clinic.      Right Ear:      1000 Hz RESPONSE- on Level: tone not heard (Conditioning sound)   1000 Hz: RESPONSE- on Level: tone not heard   2000 Hz: RESPONSE- on Level: tone not heard   4000 Hz: RESPONSE- on Level: tone not heard    Left Ear:      4000 Hz: RESPONSE- on Level: tone not heard   2000 Hz: RESPONSE- on Level: tone not heard   1000 Hz: RESPONSE- on Level: tone not heard    500 Hz: RESPONSE- on Level: tone not heard    Right Ear:    " 500 Hz: RESPONSE- on Level: tone not heard    Hearing Acuity: REFER    Hearing Assessment: abnormal--refer to audiology   Fall risk  Fallen 2 or more times in the past year?: No  Any fall with injury in the past year?: No    Cognitive Screening is normal    Do you have sleep apnea, excessive snoring or daytime drowsiness?: no    Reviewed and updated as needed this visit by clinical staff   Tobacco  Allergies  Meds                Reviewed and updated as needed this visit by Provider                   Social History     Tobacco Use     Smoking status: Never Smoker     Smokeless tobacco: Never Used   Substance Use Topics     Alcohol use: Yes     Comment: occasional       additional problems to add (Optional):[id number]}    Current providers sharing in care for this patient include:   Patient Care Team:  Rene Kennedy MD as PCP - General (Family Medicine)  Rene Kennedy MD as Assigned PCP    The following health maintenance items are reviewed in Epic and correct as of today:  Health Maintenance Due   Topic Date Due     ANNUAL REVIEW OF  ORDERS  Never done     ASTHMA ACTION PLAN  Never done     ASTHMA CONTROL TEST  Never done     ADVANCE CARE PLANNING  Never done     HEPATITIS C SCREENING  Never done     ZOSTER IMMUNIZATION (1 of 2) Never done     AORTIC ANEURYSM SCREENING (SYSTEM ASSIGNED)  Never done     INFLUENZA VACCINE (1) 09/01/2021     PHQ-2 (once per calendar year)  Never done     BMP  04/05/2022     LIPID  04/05/2022     FALL RISK ASSESSMENT  04/05/2022     MEDICARE ANNUAL WELLNESS VISIT  04/05/2022           Patient reports intermittent difficulties with his urine stream.  This has resolved.  He has a skin lesion on the right temple he would like to have checked.  It is relatively new.  No history of skin cancer.  He has noticed a rash on the right side of his foot.  He has tried some over-the-counter cortisone cream without much success.  It is intermittently pruritic.  Hypertension and GERD are  "well controlled.  He is tolerating current medications.      Review of Systems  Constitutional, HEENT, cardiovascular, pulmonary, GI, , musculoskeletal, neuro, skin, endocrine and psych systems are negative, except as otherwise noted.    OBJECTIVE:   /80 (BP Location: Right arm, Cuff Size: Adult Large)   Pulse 72   Temp 97  F (36.1  C) (Tympanic)   Ht 1.803 m (5' 11\")   Wt 111.9 kg (246 lb 12.8 oz)   BMI 34.42 kg/m   Estimated body mass index is 34.42 kg/m  as calculated from the following:    Height as of this encounter: 1.803 m (5' 11\").    Weight as of this encounter: 111.9 kg (246 lb 12.8 oz).  Physical Exam  GENERAL: healthy, alert and no distress  NECK: no adenopathy, no asymmetry, masses, or scars and thyroid normal to palpation  RESP: lungs clear to auscultation - no rales, rhonchi or wheezes  CV: regular rate and rhythm, normal S1 S2, no S3 or S4, no murmur, click or rub, no peripheral edema and peripheral pulses strong  ABDOMEN: soft, nontender, no hepatosplenomegaly, no masses and bowel sounds normal  MS: no gross musculoskeletal defects noted, no edema  SKIN: no suspicious lesions or rashes, 7 mm, pink, slightly raised lesion on right temple.  Eczematous, slightly irritated patch right lateral ankle        ASSESSMENT / PLAN:   Daniel was seen today for wellness visit.    Diagnoses and all orders for this visit:    Encounter for Medicare annual wellness exam    Bilateral sensorineural hearing loss  Patient prefers to see audiology evaluation on his own  Gastroesophageal reflux disease, unspecified whether esophagitis present  Symptoms well controlled with omeprazole  -     omeprazole (PRILOSEC) 20 MG DR capsule; Take 1 capsule (20 mg) by mouth daily    Primary hypertension  Blood pressure well controlled  -     Basic metabolic panel  (Ca, Cl, CO2, Creat, Gluc, K, Na, BUN); Future  -     lisinopril-hydrochlorothiazide (ZESTORETIC) 20-12.5 MG tablet; Take 1 tablet by mouth daily  -     Basic " "metabolic panel  (Ca, Cl, CO2, Creat, Gluc, K, Na, BUN)    Screening for cardiovascular condition  -     Lipid panel reflex to direct LDL Fasting; Future  -     Lipid panel reflex to direct LDL Fasting    Eczema, unspecified type  -     triamcinolone (KENALOG) 0.1 % external cream; Apply topically 2 times daily  Treat with triamcinolone and follow-up if not improving  Seborrheic keratosis  Observe, discussed benign nature of lesion        Patient has been advised of split billing requirements and indicates understanding: Yes    COUNSELING:  Reviewed preventive health counseling, as reflected in patient instructions       Regular exercise       Healthy diet/nutrition       Vision screening       Hearing screening       Dental care       Bladder control       Fall risk prevention       Alcohol Use        Colon cancer screening       Prostate cancer screening    Estimated body mass index is 34.42 kg/m  as calculated from the following:    Height as of this encounter: 1.803 m (5' 11\").    Weight as of this encounter: 111.9 kg (246 lb 12.8 oz).    Weight management plan: Discussed healthy diet and exercise guidelines    He reports that he has never smoked. He has never used smokeless tobacco.      Appropriate preventive services were discussed with this patient, including applicable screening as appropriate for cardiovascular disease, diabetes, osteopenia/osteoporosis, and glaucoma.  As appropriate for age/gender, discussed screening for colorectal cancer, prostate cancer, breast cancer, and cervical cancer. Checklist reviewing preventive services available has been given to the patient.    Reviewed patients plan of care and provided an AVS. The Basic Care Plan (routine screening as documented in Health Maintenance) for Daniel meets the Care Plan requirement. This Care Plan has been established and reviewed with the Patient.    Counseling Resources:  ATP IV Guidelines  Pooled Cohorts Equation Calculator  Breast Cancer " Risk Calculator  Breast Cancer: Medication to Reduce Risk  FRAX Risk Assessment  ICSI Preventive Guidelines  Dietary Guidelines for Americans, 2010  [employer]'s MyPlate  ASA Prophylaxis  Lung CA Screening    Rene Kennedy MD  [facility]    Identified Health Risks:

## 2022-04-20 NOTE — PATIENT INSTRUCTIONS
Patient Education   Personalized Prevention Plan  You are due for the preventive services outlined below.  Your care team is available to assist you in scheduling these services.  If you have already completed any of these items, please share that information with your care team to update in your medical record.  Health Maintenance Due   Topic Date Due     ANNUAL REVIEW OF HM ORDERS  Never done     Asthma Action Plan - yearly  Never done     Asthma Control Test  Never done     Hepatitis C Screening  Never done     Zoster (Shingles) Vaccine (1 of 2) Never done     AORTIC ANEURYSM SCREENING (SYSTEM ASSIGNED)  Never done     Flu Vaccine (1) 09/01/2021     Basic Metabolic Panel  04/05/2022     Cholesterol Lab  04/05/2022     FALL RISK ASSESSMENT  04/05/2022

## 2022-04-20 NOTE — LETTER
April 20, 2022      Daniel Hernandez   [facility] [address]        Dear ,    We are writing to inform you of your test results.    Your test results fall within the expected range(s) or remain unchanged from previous results.  Your bad cholesterol or LDL is bit higher than it was a year ago.  Please follow a low-cholesterol, heart healthy diet with regular exercise.  Cholesterol medication is not advised at this point.  If the numbers continue to increase this may be consideration.    Resulted Orders   Lipid panel reflex to direct LDL Fasting   Result Value Ref Range    Cholesterol 208 (H) <200 mg/dL    Triglycerides 107 <150 mg/dL    Direct Measure HDL 39 (L) >=40 mg/dL    LDL Cholesterol Calculated 148 (H) <=100 mg/dL    Non HDL Cholesterol 169 (H) <130 mg/dL    Patient Fasting > 8hrs? Yes     Narrative    Cholesterol  Desirable:  <200 mg/dL    Triglycerides  Normal:  Less than 150 mg/dL  Borderline High:  150-199 mg/dL  High:  200-499 mg/dL  Very High:  Greater than or equal to 500 mg/dL    Direct Measure HDL  Female:  Greater than or equal to 50 mg/dL   Male:  Greater than or equal to 40 mg/dL    LDL Cholesterol  Desirable:  <100mg/dL  Above Desirable:  100-129 mg/dL   Borderline High:  130-159 mg/dL   High:  160-189 mg/dL   Very High:  >= 190 mg/dL    Non HDL Cholesterol  Desirable:  130 mg/dL  Above Desirable:  130-159 mg/dL  Borderline High:  160-189 mg/dL  High:  190-219 mg/dL  Very High:  Greater than or equal to 220 mg/dL   Basic metabolic panel  (Ca, Cl, CO2, Creat, Gluc, K, Na, BUN)   Result Value Ref Range    Sodium 136 133 - 144 mmol/L    Potassium 4.3 3.4 - 5.3 mmol/L    Chloride 103 94 - 109 mmol/L    Carbon Dioxide (CO2) 27 20 - 32 mmol/L    Anion Gap 6 3 - 14 mmol/L    Urea Nitrogen 21 7 - 30 mg/dL    Creatinine 0.84 0.66 - 1.25 mg/dL    Calcium 9.6 8.5 - 10.1 mg/dL    Glucose 102 (H) 70 - 99 mg/dL    GFR Estimate >90 >60 mL/min/1.73m2      Comment:       Effective December 21, 2021 eGFRcr in adults is calculated using the 2021 CKD-EPI creatinine equation which includes age and gender (Teto et al., NEJM, DOI: 10.1056/ZUVWuc1349183)       If you have any questions or concerns, please call the clinic at the number listed above.       Sincerely,      Rene Kennedy MD

## 2022-06-16 NOTE — PROCEDURES
Accession Number:       [id number]  PATHOLOGIST:     Gabriela Fregoso MD Board Certified in Anatomic Pathology and Clinical Pathology (electronic signature)  A SOURCE:     Skin, left shin, excision  A GROSS DESCRIPTION:     See comment       Specimen is received in formalin, labeled with       multiple patient identifier(s) and consists of       one piece of a skin ellipse measuring 1.7 x 1.0 x       0.4 cm, tan-gray in color, with a pigmented       lesion measuring 0.8 x 0.7 cm, tan-brown in       color. The margins are inked green. The specimen       is serially sectioned and entirely submitted in       two cassettes.       Cassette No.1- Both tips.       Cassette No.2- Remaining tissue. The specimen       site is taken from the container.         Gross exam(s) performed at: [address] [address]         : DAVID CASTANON MD  A DIAGNOSIS:     Verruca (wart).

## 2023-04-25 ENCOUNTER — LAB (OUTPATIENT)
Dept: FAMILY MEDICINE | Facility: CLINIC | Age: 73
End: 2023-04-25

## 2023-04-25 ENCOUNTER — OFFICE VISIT (OUTPATIENT)
Dept: FAMILY MEDICINE | Facility: CLINIC | Age: 73
End: 2023-04-25
Payer: COMMERCIAL

## 2023-04-25 VITALS
RESPIRATION RATE: 20 BRPM | BODY MASS INDEX: 34.15 KG/M2 | OXYGEN SATURATION: 99 % | WEIGHT: 243.9 LBS | DIASTOLIC BLOOD PRESSURE: 78 MMHG | HEIGHT: 71 IN | SYSTOLIC BLOOD PRESSURE: 121 MMHG | HEART RATE: 56 BPM | TEMPERATURE: 98 F

## 2023-04-25 DIAGNOSIS — H90.3 BILATERAL SENSORINEURAL HEARING LOSS: ICD-10-CM

## 2023-04-25 DIAGNOSIS — K21.9 GASTROESOPHAGEAL REFLUX DISEASE, UNSPECIFIED WHETHER ESOPHAGITIS PRESENT: ICD-10-CM

## 2023-04-25 DIAGNOSIS — Z13.220 SCREENING FOR HYPERLIPIDEMIA: ICD-10-CM

## 2023-04-25 DIAGNOSIS — Z00.00 ENCOUNTER FOR MEDICARE ANNUAL WELLNESS EXAM: Primary | ICD-10-CM

## 2023-04-25 DIAGNOSIS — I10 PRIMARY HYPERTENSION: ICD-10-CM

## 2023-04-25 DIAGNOSIS — Z12.11 SCREEN FOR COLON CANCER: ICD-10-CM

## 2023-04-25 LAB
ANION GAP SERPL CALCULATED.3IONS-SCNC: 10 MMOL/L (ref 7–15)
BUN SERPL-MCNC: 21.3 MG/DL (ref 8–23)
CALCIUM SERPL-MCNC: 9.7 MG/DL (ref 8.8–10.2)
CHLORIDE SERPL-SCNC: 103 MMOL/L (ref 98–107)
CHOLEST SERPL-MCNC: 192 MG/DL
CREAT SERPL-MCNC: 0.93 MG/DL (ref 0.67–1.17)
DEPRECATED HCO3 PLAS-SCNC: 24 MMOL/L (ref 22–29)
GFR SERPL CREATININE-BSD FRML MDRD: 88 ML/MIN/1.73M2
GLUCOSE SERPL-MCNC: 103 MG/DL (ref 70–99)
HDLC SERPL-MCNC: 41 MG/DL
LDLC SERPL CALC-MCNC: 138 MG/DL
NONHDLC SERPL-MCNC: 151 MG/DL
POTASSIUM SERPL-SCNC: 4.4 MMOL/L (ref 3.4–5.3)
SODIUM SERPL-SCNC: 137 MMOL/L (ref 136–145)
TRIGL SERPL-MCNC: 67 MG/DL

## 2023-04-25 PROCEDURE — 0134A COVID-19 VACCINE BIVALENT BOOSTER 18+ (MODERNA): CPT | Performed by: FAMILY MEDICINE

## 2023-04-25 PROCEDURE — 91313 COVID-19 VACCINE BIVALENT BOOSTER 18+ (MODERNA): CPT | Performed by: FAMILY MEDICINE

## 2023-04-25 PROCEDURE — G0439 PPPS, SUBSEQ VISIT: HCPCS | Performed by: FAMILY MEDICINE

## 2023-04-25 PROCEDURE — 80061 LIPID PANEL: CPT | Performed by: FAMILY MEDICINE

## 2023-04-25 PROCEDURE — 99214 OFFICE O/P EST MOD 30 MIN: CPT | Mod: 25 | Performed by: FAMILY MEDICINE

## 2023-04-25 PROCEDURE — 36415 COLL VENOUS BLD VENIPUNCTURE: CPT | Performed by: FAMILY MEDICINE

## 2023-04-25 PROCEDURE — 80048 BASIC METABOLIC PNL TOTAL CA: CPT | Performed by: FAMILY MEDICINE

## 2023-04-25 RX ORDER — LISINOPRIL AND HYDROCHLOROTHIAZIDE 12.5; 2 MG/1; MG/1
1 TABLET ORAL DAILY
Qty: 90 TABLET | Refills: 3 | Status: SHIPPED | OUTPATIENT
Start: 2023-04-25

## 2023-04-25 ASSESSMENT — ENCOUNTER SYMPTOMS
DYSURIA: 0
COUGH: 1
HEARTBURN: 0
JOINT SWELLING: 0
FREQUENCY: 1
EYE PAIN: 0
ABDOMINAL PAIN: 0
NAUSEA: 0
SHORTNESS OF BREATH: 1
CONSTIPATION: 0
ARTHRALGIAS: 1
DIARRHEA: 0
CHILLS: 0
MYALGIAS: 1
PARESTHESIAS: 0
PALPITATIONS: 0
SORE THROAT: 0
NERVOUS/ANXIOUS: 0
HEMATURIA: 0
HEADACHES: 0
WEAKNESS: 0
HEMATOCHEZIA: 0
DIZZINESS: 0
FEVER: 0

## 2023-04-25 ASSESSMENT — ACTIVITIES OF DAILY LIVING (ADL): CURRENT_FUNCTION: NO ASSISTANCE NEEDED

## 2023-04-25 ASSESSMENT — ASTHMA QUESTIONNAIRES
ACT_TOTALSCORE: 23
QUESTION_2 LAST FOUR WEEKS HOW OFTEN HAVE YOU HAD SHORTNESS OF BREATH: ONCE OR TWICE A WEEK
QUESTION_1 LAST FOUR WEEKS HOW MUCH OF THE TIME DID YOUR ASTHMA KEEP YOU FROM GETTING AS MUCH DONE AT WORK, SCHOOL OR AT HOME: NONE OF THE TIME
QUESTION_5 LAST FOUR WEEKS HOW WOULD YOU RATE YOUR ASTHMA CONTROL: WELL CONTROLLED
ACT_TOTALSCORE: 23
QUESTION_4 LAST FOUR WEEKS HOW OFTEN HAVE YOU USED YOUR RESCUE INHALER OR NEBULIZER MEDICATION (SUCH AS ALBUTEROL): NOT AT ALL
QUESTION_3 LAST FOUR WEEKS HOW OFTEN DID YOUR ASTHMA SYMPTOMS (WHEEZING, COUGHING, SHORTNESS OF BREATH, CHEST TIGHTNESS OR PAIN) WAKE YOU UP AT NIGHT OR EARLIER THAN USUAL IN THE MORNING: NOT AT ALL

## 2023-04-25 NOTE — LETTER
April 25, 2023      Daniel Hernandez   [facility] [address]        Dear ,    We are writing to inform you of your test results.    Your test results fall within the expected range(s) or remain unchanged from previous results.  Please continue with current treatment plan.    Resulted Orders   BASIC METABOLIC PANEL   Result Value Ref Range    Sodium 137 136 - 145 mmol/L    Potassium 4.4 3.4 - 5.3 mmol/L    Chloride 103 98 - 107 mmol/L    Carbon Dioxide (CO2) 24 22 - 29 mmol/L    Anion Gap 10 7 - 15 mmol/L    Urea Nitrogen 21.3 8.0 - 23.0 mg/dL    Creatinine 0.93 0.67 - 1.17 mg/dL    Calcium 9.7 8.8 - 10.2 mg/dL    Glucose 103 (H) 70 - 99 mg/dL    GFR Estimate 88 >60 mL/min/1.73m2      Comment:      eGFR calculated using 2021 CKD-EPI equation.   Lipid panel reflex to direct LDL Non-fasting   Result Value Ref Range    Cholesterol 192 <200 mg/dL    Triglycerides 67 <150 mg/dL    Direct Measure HDL 41 >=40 mg/dL    LDL Cholesterol Calculated 138 (H) <=100 mg/dL    Non HDL Cholesterol 151 (H) <130 mg/dL    Narrative    Cholesterol  Desirable:  <200 mg/dL    Triglycerides  Normal:  Less than 150 mg/dL  Borderline High:  150-199 mg/dL  High:  200-499 mg/dL  Very High:  Greater than or equal to 500 mg/dL    Direct Measure HDL  Female:  Greater than or equal to 50 mg/dL   Male:  Greater than or equal to 40 mg/dL    LDL Cholesterol  Desirable:  <100mg/dL  Above Desirable:  100-129 mg/dL   Borderline High:  130-159 mg/dL   High:  160-189 mg/dL   Very High:  >= 190 mg/dL    Non HDL Cholesterol  Desirable:  130 mg/dL  Above Desirable:  130-159 mg/dL  Borderline High:  160-189 mg/dL  High:  190-219 mg/dL  Very High:  Greater than or equal to 220 mg/dL       If you have any questions or concerns, please call the clinic at the number listed above.       Sincerely,      Rene Kennedy MD

## 2023-04-25 NOTE — PROGRESS NOTES
"    The patient was provided with written information regarding signs of hearing loss.  Information on urinary incontinence and treatment options given to patient.  He is at risk for falling and has been provided with information to reduce the risk of falling at home.  Answers for HPI/ROS submitted by the patient on 4/25/2023  In general, how would you rate your overall physical health?: good  Frequency of exercise:: 6-7 days/week  Do you usually eat at least 4 servings of fruit and vegetables a day, include whole grains & fiber, and avoid regularly eating high fat or \"junk\" foods? : Yes  Taking medications regularly:: Yes  Medication side effects:: None  Activities of Daily Living: no assistance needed  Home safety: lack of grab bars in the bathroom  Hearing Impairment:: difficulty following a conversation in a noisy restaurant or crowded room, feel that people are mumbling or not speaking clearly, difficulty following dialogue in the theater, difficult to understand a speaker at a public meeting or Spiritism service, need to ask people to speak up or repeat themselves, find that men's voices are easier to understand than woman's, difficulty understanding soft or whispered speech, difficulty understanding speech on the telephone  In the past 6 months, have you been bothered by leaking of urine?: Yes  abdominal pain: No  Blood in stool: No  Blood in urine: No  chest pain: No  chills: No  congestion: Yes  constipation: No  cough: Yes  diarrhea: No  dizziness: No  ear pain: No  eye pain: No  nervous/anxious: No  fever: No  frequency: Yes  genital sores: No  headaches: No  hearing loss: Yes  heartburn: No  arthralgias: Yes  joint swelling: No  peripheral edema: No  mood changes: No  myalgias: Yes  nausea: No  dysuria: No  palpitations: No  Skin sensation changes: No  sore throat: No  urgency: No  rash: No  shortness of breath: Yes  visual disturbance: No  weakness: No  impotence: Yes  penile discharge: No  In general, " how would you rate your overall mental or emotional health?: excellent  Additional concerns today:: No  Duration of exercise:: Greater than 60 minutes

## 2023-04-25 NOTE — PATIENT INSTRUCTIONS
Patient Education   Personalized Prevention Plan  You are due for the preventive services outlined below.  Your care team is available to assist you in scheduling these services.  If you have already completed any of these items, please share that information with your care team to update in your medical record.  Health Maintenance Due   Topic Date Due     ANNUAL REVIEW OF HM ORDERS  Never done     Asthma Action Plan - yearly  Never done     Zoster (Shingles) Vaccine (1 of 2) Never done     AORTIC ANEURYSM SCREENING (SYSTEM ASSIGNED)  Never done     COVID-19 Vaccine (5 - Booster for Moderna series) 09/13/2022     Basic Metabolic Panel  04/20/2023     Cholesterol Lab  04/20/2023     Colorectal Cancer Screening  05/09/2023       Signs of Hearing Loss  Hearing loss is a problem shared by many people. In fact, it's one of the most common health problems, particularly as people age. Most people aged 65 and older have some hearing loss. By age 80, almost everyone does. Hearing loss often occurs slowly over the years. So, you may not realize your hearing has gotten worse.   When sudden hearing loss occurs, it's important to contact your healthcare provider right away. Your provider will do a medical exam and a hearing exam as soon as possible. This is to help find the cause and type of your sudden hearing loss. Based on your diagnosis, your healthcare provider will discuss possible treatments.      Hearing much better with one ear can be a sign of hearing loss.     Have your hearing checked  Call your healthcare provider if you:     Have to strain to hear normal conversation    Have to watch other people s faces very carefully to follow what they re saying    Need to ask people to repeat what they ve said    Often misunderstand what people are saying    Turn the volume of the television or radio up so high that others complain    Feel that people are mumbling when they re talking to you    Find that the effort to hear  leaves you feeling tired and irritated    Notice, when using the phone, that you hear better with one ear than the other  [employer] last reviewed this educational content on 6/1/2022 2000-2022 [employer]. All rights reserved. This information is not intended as a substitute for professional medical care. Always follow your healthcare professional's instructions.          Urinary Incontinence (Male)  We understand that gender is a spectrum. We may use gendered terms to talk about anatomy and health risk. Please use this sheet in a way that works best for you and your provider as you talk about your care.     Urinary incontinence means not being able to control the release of urine from the bladder.   Causes  Common causes of urinary incontinence in men include:    Infection    Certain medicines    Aging    Poor pelvic muscle tone    Bladder spasms    Obesity    Enlarged prostate    Trouble urinating and fully emptying the bladder (urinary retention)  Other things that can cause incontinence are:     Nervous system diseases    Diabetes    Sleep apnea    Urinary tract infections    Prostate surgery    Pelvic injury  Constipation and smoking have also been identified as risk factors.   Symptoms    Urge incontinence (overactive bladder). This is a sudden urge to urinate. It occurs even though there may not be much urine in the bladder. The need to urinate often during the night is common. It's due to overactive bladder muscles.    Stress incontinence. This is urine leakage that you can't control. It can occur with sneezing, coughing, and other actions that put stress on the bladder.    Treatment  Treatment depends on what is causing the condition. Bladder infections are treated with antibiotics. Urinary retention is treated with a bladder catheter.   Home care  Follow these guidelines when caring for yourself at home:    Don't have any foods and drinks that may irritate the bladder. This  includes:  ? Chocolate  ? Alcohol  ? Caffeine  ? Carbonated drinks  ? Acidic fruits and juices    Limit fluids to 6 to 8 cups a day.    Lose weight if you are overweight. This may reduce your symptoms.    If advised, do regular pelvic muscle-strengthening exercises such as Kegel exercises.    If needed, wear absorbent pads to catch urine. Change the pads often. This is for good hygiene and to prevent skin and bladder infections.    Bathe daily for good hygiene.    If an antibiotic was prescribed to treat a bladder infection, take it until it's finished. Keep taking it even if you are feeling better. This is to make sure your infection has cleared.    If a catheter was left in place, keep bacteria from getting into the collection bag. Don't disconnect the catheter from the collection bag.    Use a leg band to secure the catheter drainage tube, so it does not pull on the catheter. Drain the collection bag when it becomes full. To do this, use the drain spout at the bottom of the bag. Don't disconnect the bag from the catheter.    Don't pull on or try to remove a catheter. The catheter must be removed by a healthcare provider.    If you smoke, stop. Ask your provider for help if you can't do this on your own.  Follow-up care  Follow up with your healthcare provider, or as advised.  When to get medical advice  Call your healthcare provider right away if any of these occur:     Fever over 100.4 F (38 C), or as directed by your provider    Bladder pain or fullness    Belly swelling, nausea, or vomiting    Back pain    Weakness, dizziness, or fainting    If a catheter was left in place, return if:  ? The catheter falls out  ? The catheter stops draining for 6 hours  ? Your urine gets cloudy or smells bad  [employer] last reviewed this educational content on 6/1/2022 2000-2022 [employer]. All rights reserved. This information is not intended as a substitute for professional medical care. Always follow your  healthcare professional's instructions.          Preventing Falls at Home  A person can fall for many reasons. Older adults may fall because reaction time slows down as we age. Your muscles and joints may get stiff, weak, or less flexible because of illness, medicines, or a physical condition.   Other health problems that make falls more likely include:     Arthritis    Dizziness or lightheadedness when you stand up (orthostatic hypotension)    History of a stroke    Dizziness    Anemia    Certain medicines taken for mental illness or to control blood pressure.    Problems with balance or gait    Bladder or urinary problems    History of falling    Changes in vision (vision impairment)    Changes in thinking skills and memory (cognitive impairment)  Injuries from a fall can include serious injuries such as broken bones, dislocated joints, internal bleeding and cuts. Injuries like these can limit your independence.   Prevention tips  To help prevent falls and fall-related injuries, follow the tips below.    Floors  To make floors safer:     Put nonskid pads under area rugs.    Remove small rugs.    Replace worn floor coverings.    Tack carpets firmly to each step on carpeted stairs. Put nonskid strips on the edges of uncarpeted stairs.    Keep floors and stairs free of clutter and cords.    Arrange furniture so there are clear pathways.    Clean up any spills right away.  Bathrooms    To make bathrooms safer:     Install grab bars in the tub or shower.    Apply nonskid strips or put a nonskid rubber mat in the tub or shower.    Sit on a bath chair to bathe.    Use bathmats with nonskid backing.  Lighting  To improve visibility in your home:      Keep a flashlight in each room. Or put a lamp next to the bed within easy reach.    Put nightlights in the bedrooms, hallways, kitchen, and bathrooms.    Make sure all stairways have good lighting.    Take your time when going up and down stairs.    Put handrails on both  sides of stairs and in walkways for more support. To prevent injury to your wrist or arm, don t use handrails to pull yourself up.    Install grab bars to pull yourself up.    Move or rearrange items that you use often. This will make them easier to find or reach.    Look at your home to find any safety hazards. Especially look at doorways, walkways, and the driveway. Remove or repair any safety problems that you find.  Other changes to make    Look around to find any safety hazards. Look closely at doorways, walkways, and the driveway. Remove or repair any safety problems that you find.    Wear shoes that fit well.    Take your time when going up and down stairs.    Put handrails on both sides of stairs and in walkways for more support. To prevent injury to your wrist or arm, don t use handrails to pull yourself up.    Install grab bars wherever needed to pull yourself up.    Arrange items that you use often. This will make them easier to find or reach.    [email or web address] last reviewed this educational content on 3/1/2020    6557-2029 [employer]. All rights reserved. This information is not intended as a substitute for professional medical care. Always follow your healthcare professional's instructions.

## 2023-04-25 NOTE — PROGRESS NOTES
"SUBJECTIVE:   Daniel is a 73 year old who presents for Preventive Visit.      4/25/2023     9:33 AM   Additional Questions   Roomed by Tanya HOLM CMA   Accompanied by Self         4/25/2023     9:33 AM   Patient Reported Additional Medications   Patient reports taking the following new medications None     Patient has been advised of split billing requirements and indicates understanding: Yes  Are you in the first 12 months of your Medicare coverage?  No    Healthy Habits:     In general, how would you rate your overall health?  Good    Frequency of exercise:  6-7 days/week    Duration of exercise:  Greater than 60 minutes    Do you usually eat at least 4 servings of fruit and vegetables a day, include whole grains    & fiber and avoid regularly eating high fat or \"junk\" foods?  Yes    Taking medications regularly:  Yes    Medication side effects:  None    Ability to successfully perform activities of daily living:  No assistance needed    Home Safety:  Lack of grab bars in the bathroom    Hearing Impairment:  Difficulty following a conversation in a noisy restaurant or crowded room, feel that people are mumbling or not speaking clearly, difficulty following dialogue in the theater, difficult to understand a speaker at a public meeting or Sh[employer] service, need to ask people to speak up or repeat themselves, find that men's voices are easier to understand than woman's, difficulty understanding soft or whispered speech and difficulty understanding speech on the telephone    In the past 6 months, have you been bothered by leaking of urine? Yes    In general, how would you rate your overall mental or emotional health?  Excellent      PHQ-2 Total Score: 1    Additional concerns today:  No    He has had a cough after eating.  It does not seem to be related to what he has been eating.  He has a hard candy after he eats this gets better.  He has been compliant with his PPI.  He does not describe this as a dry cough that " would make it suspicious for ACE inhibitor related.  He reports intermittent shoulder pain especially after he has been working throughout the day.  He has been taking ibuprofen in the evening and allows him to sleep well.    Have you ever done Advance Care Planning? (For example, a Health Directive, POLST, or a discussion with a medical provider or your loved ones about your wishes): Yes, patient states has an Advance Care Planning document and will bring a copy to the clinic.    Fall risk  Fallen 2 or more times in the past year?: Yes  Any fall with injury in the past year?: Yes    Cognitive Screening   1) Repeat 3 items (Leader, Season, Table)    2) Clock draw: ABNORMAL wrong hand placement for 11:10  3) 3 item recall: Recalls 2 objects   Results: ABNORMAL clock, 1-2 items recalled: PROBABLE COGNITIVE IMPAIRMENT, **INFORM PROVIDER**    Mini-CogTM Copyright CRISTAL Moseley. Licensed by the author for use in [facility]; reprinted with permission (isha@[facility]). All rights reserved.          Reviewed and updated as needed this visit by clinical staff   Tobacco   Meds              Reviewed and updated as needed this visit by Provider                 Social History     Tobacco Use     Smoking status: Never     Smokeless tobacco: Never   Vaping Use     Vaping status: Never Used   Substance Use Topics     Alcohol use: Yes     Comment: occasional             4/25/2023     9:07 AM   Alcohol Use   Prescreen: >3 drinks/day or >7 drinks/week? No          View : No data to display.              Do you have a current opioid prescription? No  Do you use any other controlled substances or medications that are not prescribed by a provider? None      Current providers sharing in care for this patient include:   Patient Care Team:  Rene Kennedy MD as PCP - General (Family Medicine)  Rene Kennedy MD as Assigned PCP    The following health maintenance items are reviewed in Epic and correct as of today:  Health  "Maintenance   Topic Date Due     ANNUAL REVIEW OF  ORDERS  Never done     ASTHMA ACTION PLAN  Never done     ZOSTER IMMUNIZATION (1 of 2) Never done     AORTIC ANEURYSM SCREENING (SYSTEM ASSIGNED)  Never done     COVID-19 Vaccine (5 - Booster for Moderna series) 09/13/2022     BMP  04/20/2023     LIPID  04/20/2023     MEDICARE ANNUAL WELLNESS VISIT  04/20/2023     COLORECTAL CANCER SCREENING  05/09/2023     HEPATITIS C SCREENING  04/25/2028 (Originally 4/5/1968)     ASTHMA CONTROL TEST  10/25/2023     FALL RISK ASSESSMENT  04/25/2024     ADVANCE CARE PLANNING  04/20/2027     DTAP/TDAP/TD IMMUNIZATION (3 - Td or Tdap) 05/13/2031     PHQ-2 (once per calendar year)  Completed     INFLUENZA VACCINE  Completed     Pneumococcal Vaccine: 65+ Years  Completed     IPV IMMUNIZATION  Aged Out     MENINGITIS IMMUNIZATION  Aged Out     Lab work is in process  Labs reviewed in EPIC          Review of Systems   Constitutional: Negative for chills and fever.   HENT: Positive for congestion and hearing loss. Negative for ear pain and sore throat.    Eyes: Negative for pain and visual disturbance.   Respiratory: Positive for cough and shortness of breath.    Cardiovascular: Negative for chest pain, palpitations and peripheral edema.   Gastrointestinal: Negative for abdominal pain, constipation, diarrhea, heartburn, hematochezia and nausea.   Genitourinary: Positive for frequency and impotence. Negative for dysuria, genital sores, hematuria, penile discharge and urgency.   Musculoskeletal: Positive for arthralgias and myalgias. Negative for joint swelling.   Skin: Negative for rash.   Neurological: Negative for dizziness, weakness, headaches and paresthesias.   Psychiatric/Behavioral: Negative for mood changes. The patient is not nervous/anxious.          OBJECTIVE:   /78 (BP Location: Right arm, Patient Position: Sitting, Cuff Size: Adult Large)   Pulse 56   Temp 98  F (36.7  C) (Tympanic)   Resp 20   Ht 1.803 m (5' 11\")  " " Wt 110.6 kg (243 lb 14.4 oz)   SpO2 99%   BMI 34.02 kg/m   Estimated body mass index is 34.02 kg/m  as calculated from the following:    Height as of this encounter: 1.803 m (5' 11\").    Weight as of this encounter: 110.6 kg (243 lb 14.4 oz).  Physical Exam  GENERAL: healthy, alert and no distress  EYES: Eyes grossly normal to inspection, PERRL and conjunctivae and sclerae normal  HENT: ear canals and TM's normal, nose and mouth without ulcers or lesions  NECK: no adenopathy, no asymmetry, masses, or scars and thyroid normal to palpation  RESP: lungs clear to auscultation - no rales, rhonchi or wheezes  CV: regular rate and rhythm, normal S1 S2, no S3 or S4, no murmur, click or rub, no peripheral edema and peripheral pulses strong  ABDOMEN: soft, nontender, no hepatosplenomegaly, no masses and bowel sounds normal  MS: no gross musculoskeletal defects noted, no edema  PSYCH: mentation appears normal, affect normal/bright    Diagnostic Test Results:  Labs reviewed in Epic    ASSESSMENT / PLAN:   Daniel was seen today for wellness visit.    Diagnoses and all orders for this visit:    Encounter for Medicare annual wellness exam  -     PRIMARY CARE FOLLOW-UP SCHEDULING; Future    Primary hypertension  -     BASIC METABOLIC PANEL; Future  -     lisinopril-hydrochlorothiazide (ZESTORETIC) 20-12.5 MG tablet; Take 1 tablet by mouth daily  -     BASIC METABOLIC PANEL    Gastroesophageal reflux disease, unspecified whether esophagitis present  -     omeprazole (PRILOSEC) 20 MG DR capsule; Take 1 capsule (20 mg) by mouth daily  -     COLOGUAORA(EXACT SCIENCES); Future    Screening for hyperlipidemia  -     Lipid panel reflex to direct LDL Non-fasting; Future  -     Lipid panel reflex to direct LDL Non-fasting    Screen for colon cancer    Bilateral sensorineural hearing loss    Other orders  -     REVIEW OF HEALTH MAINTENANCE PROTOCOL ORDERS  -     COVID-19 VACCINE BIVALENT BOOSTER 18+ (MODERNA)    Chronic disease well " "controlled.  He plans on seeing an audiologist at [place].  We have discussed the importance of hearing and its relationship to cognitive decline.  Continue with omeprazole and lisinopril hydrochlorothiazide.  Laboratory evaluations today.  Update COVID booster.  Follow-up in 1 year    Patient has been advised of split billing requirements and indicates understanding: Yes      COUNSELING:  Reviewed preventive health counseling, as reflected in patient instructions       Regular exercise       Healthy diet/nutrition       Vision screening       Hearing screening       Dental care       Bladder control       Fall risk prevention       Colon cancer screening       Prostate cancer screening      BMI:   Estimated body mass index is 34.02 kg/m  as calculated from the following:    Height as of this encounter: 1.803 m (5' 11\").    Weight as of this encounter: 110.6 kg (243 lb 14.4 oz).   Weight management plan: Discussed healthy diet and exercise guidelines      He reports that he has never smoked. He has never used smokeless tobacco.      Appropriate preventive services were discussed with this patient, including applicable screening as appropriate for cardiovascular disease, diabetes, osteopenia/osteoporosis, and glaucoma.  As appropriate for age/gender, discussed screening for colorectal cancer, prostate cancer, breast cancer, and cervical cancer. Checklist reviewing preventive services available has been given to the patient.    Reviewed patients plan of care and provided an AVS. The Basic Care Plan (routine screening as documented in Health Maintenance) for Daniel meets the Care Plan requirement. This Care Plan has been established and reviewed with the Patient.          Rene Kennedy MD  [facility]    Identified Health Risks:    I have reviewed Opioid Use Disorder and Substance Use Disorder risk factors and made any needed referrals.     "

## 2023-04-27 ENCOUNTER — TELEPHONE (OUTPATIENT)
Dept: FAMILY MEDICINE | Facility: CLINIC | Age: 73
End: 2023-04-27
Payer: COMMERCIAL

## 2023-04-27 NOTE — TELEPHONE ENCOUNTER
Nicolas, a representative from [employer] for Cologuard testing calling.  He said the diagnosis of GERD will likely not be covered by patient's insurance for the Cologuard test.     Please call [employer] back with a new diagnosis code so they can try to run it again. Do not just put a new order in for the Cologuard as it will be cancelled since they already have a current order.    Cynthia MCKEONN, RN

## 2023-05-15 LAB — NONINV COLON CA DNA+OCC BLD SCRN STL QL: NEGATIVE

## 2025-07-08 ENCOUNTER — PATIENT OUTREACH (OUTPATIENT)
Dept: CARE COORDINATION | Facility: CLINIC | Age: 75
End: 2025-07-08
Payer: COMMERCIAL